# Patient Record
Sex: FEMALE | Race: WHITE | NOT HISPANIC OR LATINO | Employment: FULL TIME | URBAN - METROPOLITAN AREA
[De-identification: names, ages, dates, MRNs, and addresses within clinical notes are randomized per-mention and may not be internally consistent; named-entity substitution may affect disease eponyms.]

---

## 2022-05-12 ENCOUNTER — HOSPITAL ENCOUNTER (EMERGENCY)
Facility: HOSPITAL | Age: 22
Discharge: HOME | End: 2022-05-13
Attending: EMERGENCY MEDICINE

## 2022-05-12 ENCOUNTER — APPOINTMENT (EMERGENCY)
Dept: RADIOLOGY | Facility: HOSPITAL | Age: 22
End: 2022-05-12

## 2022-05-12 DIAGNOSIS — R00.2 PALPITATIONS: ICD-10-CM

## 2022-05-12 DIAGNOSIS — R07.9 CHEST PAIN: Primary | ICD-10-CM

## 2022-05-12 LAB
ANION GAP SERPL CALCULATED.3IONS-SCNC: 5 MMOL/L (ref 4–13)
BASOPHILS # BLD AUTO: 0.01 THOUSANDS/ΜL (ref 0–0.1)
BASOPHILS NFR BLD AUTO: 0 % (ref 0–1)
BUN SERPL-MCNC: 12 MG/DL (ref 5–25)
CALCIUM SERPL-MCNC: 9.1 MG/DL (ref 8.3–10.1)
CARDIAC TROPONIN I PNL SERPL HS: <2 NG/L
CARDIAC TROPONIN I PNL SERPL HS: <2 NG/L
CHLORIDE SERPL-SCNC: 107 MMOL/L (ref 100–108)
CO2 SERPL-SCNC: 27 MMOL/L (ref 21–32)
CREAT SERPL-MCNC: 0.65 MG/DL (ref 0.6–1.3)
D DIMER PPP FEU-MCNC: 0.34 UG/ML FEU
EOSINOPHIL # BLD AUTO: 0.01 THOUSAND/ΜL (ref 0–0.61)
EOSINOPHIL NFR BLD AUTO: 0 % (ref 0–6)
ERYTHROCYTE [DISTWIDTH] IN BLOOD BY AUTOMATED COUNT: 12.6 % (ref 11.6–15.1)
GFR SERPL CREATININE-BSD FRML MDRD: 128 ML/MIN/1.73SQ M
GLUCOSE SERPL-MCNC: 102 MG/DL (ref 65–140)
HCG SERPL QL: NEGATIVE
HCT VFR BLD AUTO: 36.6 % (ref 34.8–46.1)
HGB BLD-MCNC: 12.7 G/DL (ref 11.5–15.4)
IMM GRANULOCYTES # BLD AUTO: 0.02 THOUSAND/UL (ref 0–0.2)
IMM GRANULOCYTES NFR BLD AUTO: 0 % (ref 0–2)
LYMPHOCYTES # BLD AUTO: 2.74 THOUSANDS/ΜL (ref 0.6–4.47)
LYMPHOCYTES NFR BLD AUTO: 33 % (ref 14–44)
MCH RBC QN AUTO: 28.9 PG (ref 26.8–34.3)
MCHC RBC AUTO-ENTMCNC: 34.7 G/DL (ref 31.4–37.4)
MCV RBC AUTO: 83 FL (ref 82–98)
MONOCYTES # BLD AUTO: 0.89 THOUSAND/ΜL (ref 0.17–1.22)
MONOCYTES NFR BLD AUTO: 11 % (ref 4–12)
NEUTROPHILS # BLD AUTO: 4.74 THOUSANDS/ΜL (ref 1.85–7.62)
NEUTS SEG NFR BLD AUTO: 56 % (ref 43–75)
NRBC BLD AUTO-RTO: 0 /100 WBCS
PLATELET # BLD AUTO: 254 THOUSANDS/UL (ref 149–390)
PMV BLD AUTO: 10.9 FL (ref 8.9–12.7)
POTASSIUM SERPL-SCNC: 3.4 MMOL/L (ref 3.5–5.3)
RBC # BLD AUTO: 4.39 MILLION/UL (ref 3.81–5.12)
SODIUM SERPL-SCNC: 139 MMOL/L (ref 136–145)
WBC # BLD AUTO: 8.41 THOUSAND/UL (ref 4.31–10.16)

## 2022-05-12 PROCEDURE — 36415 COLL VENOUS BLD VENIPUNCTURE: CPT

## 2022-05-12 PROCEDURE — 85379 FIBRIN DEGRADATION QUANT: CPT

## 2022-05-12 PROCEDURE — 71045 X-RAY EXAM CHEST 1 VIEW: CPT

## 2022-05-12 PROCEDURE — 93005 ELECTROCARDIOGRAM TRACING: CPT

## 2022-05-12 PROCEDURE — 84703 CHORIONIC GONADOTROPIN ASSAY: CPT

## 2022-05-12 PROCEDURE — 80048 BASIC METABOLIC PNL TOTAL CA: CPT | Performed by: EMERGENCY MEDICINE

## 2022-05-12 PROCEDURE — 84484 ASSAY OF TROPONIN QUANT: CPT

## 2022-05-12 PROCEDURE — 99285 EMERGENCY DEPT VISIT HI MDM: CPT

## 2022-05-12 PROCEDURE — 85025 COMPLETE CBC W/AUTO DIFF WBC: CPT

## 2022-05-12 PROCEDURE — 99285 EMERGENCY DEPT VISIT HI MDM: CPT | Performed by: EMERGENCY MEDICINE

## 2022-05-12 RX ORDER — POTASSIUM CHLORIDE 20 MEQ/1
40 TABLET, EXTENDED RELEASE ORAL ONCE
Status: COMPLETED | OUTPATIENT
Start: 2022-05-12 | End: 2022-05-12

## 2022-05-12 RX ADMIN — POTASSIUM CHLORIDE 40 MEQ: 1500 TABLET, EXTENDED RELEASE ORAL at 22:26

## 2022-05-13 VITALS
OXYGEN SATURATION: 97 % | SYSTOLIC BLOOD PRESSURE: 117 MMHG | DIASTOLIC BLOOD PRESSURE: 62 MMHG | RESPIRATION RATE: 21 BRPM | TEMPERATURE: 98.9 F | HEART RATE: 78 BPM

## 2022-05-13 LAB
ATRIAL RATE: 106 BPM
P AXIS: 61 DEGREES
PR INTERVAL: 154 MS
QRS AXIS: 81 DEGREES
QRSD INTERVAL: 82 MS
QT INTERVAL: 328 MS
QTC INTERVAL: 435 MS
T WAVE AXIS: 47 DEGREES
VENTRICULAR RATE: 106 BPM

## 2022-05-13 PROCEDURE — 93010 ELECTROCARDIOGRAM REPORT: CPT | Performed by: INTERNAL MEDICINE

## 2022-05-13 NOTE — ED ATTENDING ATTESTATION
5/12/2022  IRian MD, saw and evaluated the patient  I have discussed the patient with the resident/non-physician practitioner and agree with the resident's/non-physician practitioner's findings, Plan of Care, and MDM as documented in the resident's/non-physician practitioner's note, except where noted  All available labs and Radiology studies were reviewed  I was present for key portions of any procedure(s) performed by the resident/non-physician practitioner and I was immediately available to provide assistance  At this point I agree with the current assessment done in the Emergency Department  I have conducted an independent evaluation of this patient a history and physical is as follows:    ED Course     16-year-old female, history MI and biological mother at age 39, presenting to the emergency department for evaluation of chest discomfort and palpitations that started at 19:30 today  Patient was seated in class when she had fairly abrupt onset sensation of palpitations associated chest discomfort which is described as sharp and stabbing multiple intermittent episodes within underlying chest tightness which has been continuous since 07/30  No cough  Some shortness of breath  No abdominal pain, nausea, vomiting, diarrhea  No lower extremity pain or swelling  Ten systems reviewed and negative except as noted in the history of present illness  The patient is resting comfortably on a stretcher in no acute respiratory distress  The patient appears nontoxic  HEENT reveals moist mucous membranes  Head is normocephalic and atraumatic  Conjunctiva and sclera are normal  Neck is nontender and supple with full range of motion to flexion, extension, lateral rotation  No meningismus appreciated  No masses are appreciated  Lungs are clear to auscultation bilaterally without any wheezes, rales or rhonchi  Heart is regular rate and rhythm without any murmurs, rubs or gallops   Abdomen is soft and nontender without any rebound or guarding  Extremities appear grossly normal without any significant arthropathy  Patient is awake, alert, and oriented x3  The patient has normal interaction  Motor is 5 out of 5  Labs Reviewed   BASIC METABOLIC PANEL - Abnormal       Result Value Ref Range Status    Sodium 139  136 - 145 mmol/L Final    Potassium 3 4 (*) 3 5 - 5 3 mmol/L Final    Chloride 107  100 - 108 mmol/L Final    CO2 27  21 - 32 mmol/L Final    ANION GAP 5  4 - 13 mmol/L Final    BUN 12  5 - 25 mg/dL Final    Creatinine 0 65  0 60 - 1 30 mg/dL Final    Comment: Standardized to IDMS reference method    Glucose 102  65 - 140 mg/dL Final    Comment: If the patient is fasting, the ADA then defines impaired fasting glucose as > 100 mg/dL and diabetes as > or equal to 123 mg/dL  Specimen collection should occur prior to Sulfasalazine administration due to the potential for falsely depressed results  Specimen collection should occur prior to Sulfapyridine administration due to the potential for falsely elevated results      Calcium 9 1  8 3 - 10 1 mg/dL Final    eGFR 128  ml/min/1 73sq m Final    Narrative:     [place] guidelines for Chronic Kidney Disease (CKD):     Stage 1 with normal or high GFR (GFR > 90 mL/min/1 73 square meters)    Stage 2 Mild CKD (GFR = 60-89 mL/min/1 73 square meters)    Stage 3A Moderate CKD (GFR = 45-59 mL/min/1 73 square meters)    Stage 3B Moderate CKD (GFR = 30-44 mL/min/1 73 square meters)    Stage 4 Severe CKD (GFR = 15-29 mL/min/1 73 square meters)    Stage 5 End Stage CKD (GFR <15 mL/min/1 73 square meters)  Note: GFR calculation is accurate only with a steady state creatinine   HS TROPONIN I 0HR - Normal    hs TnI 0hr <2  "Refer to ACS Flowchart"- see link ng/L Final    Comment:                                              Initial (time 0) result  If >=50 ng/L, Myocardial injury suggested ;  Type of myocardial injury and treatment strategy  to be determined  If 5-49 ng/L, a delta result at 2 hours and or 4 hours will be needed to further evaluate  If <4 ng/L, and chest pain has been >3 hours since onset, patient may qualify for discharge based on the HEART score in the ED  If <5 ng/L and <3hours since onset of chest pain, a delta result at 2 hours will be needed to further evaluate  HS Troponin 99th Percentile URL of a Health Population=12 ng/L with a 95% Confidence Interval of 8-18 ng/L  Second Troponin (time 2 hours)  If calculated delta >= 20 ng/L,  Myocardial injury suggested ; Type of myocardial injury and treatment strategy to be determined  If 5-49 ng/L and the calculated delta is 5-19 ng/L, consult medical service for evaluation  Continue evaluation for ischemia on ecg and other possible etiology and repeat hs troponin at 4 hours  If delta is <5 ng/L at 2 hours, consider discharge based on risk stratification via the HEART score (if in ED), or KATHY risk score in IP/Observation  HS Troponin 99th Percentile URL of a Health Population=12 ng/L with a 95% Confidence Interval of 8-18 ng/L  D-DIMER, QUANTITATIVE - Normal    D-Dimer, Quant 0 34  <0 50 ug/ml FEU Final    Comment: Reference and upper limits to exclude DVT and PE are the same  Do not use to exclude if clinical symptoms are present  Pregnant women:  1st trimester:  <0 22 - 1 06 ug/ml FEU  2nd trimester:  <0 22 - 1 88 ug/ml FEU  3rd trimester:   0 24 - 3 28 ug/ml FEU    Note: Normal ranges may not apply to patients who are transgender, non-binary, or whose legal sex, sex at birth, and gender identity differ       PREGNANCY TEST (HCG QUALITATIVE) - Normal    Preg, Serum Negative  Negative Final   HS TROPONIN I 2HR    hs TnI 2hr <2  "Refer to ACS Flowchart"- see link ng/L Final    Comment:                                              Initial (time 0) result  If >=50 ng/L, Myocardial injury suggested ;  Type of myocardial injury and treatment strategy  to be determined  If 5-49 ng/L, a delta result at 2 hours and or 4 hours will be needed to further evaluate  If <4 ng/L, and chest pain has been >3 hours since onset, patient may qualify for discharge based on the HEART score in the ED  If <5 ng/L and <3hours since onset of chest pain, a delta result at 2 hours will be needed to further evaluate  HS Troponin 99th Percentile URL of a Health Population=12 ng/L with a 95% Confidence Interval of 8-18 ng/L  Second Troponin (time 2 hours)  If calculated delta >= 20 ng/L,  Myocardial injury suggested ; Type of myocardial injury and treatment strategy to be determined  If 5-49 ng/L and the calculated delta is 5-19 ng/L, consult medical service for evaluation  Continue evaluation for ischemia on ecg and other possible etiology and repeat hs troponin at 4 hours  If delta is <5 ng/L at 2 hours, consider discharge based on risk stratification via the HEART score (if in ED), or KATHY risk score in IP/Observation  HS Troponin 99th Percentile URL of a Health Population=12 ng/L with a 95% Confidence Interval of 8-18 ng/L      Delta 2hr hsTnI     Final    Comment: Unable to Calculate   CBC AND DIFFERENTIAL    WBC 8 41  4 31 - 10 16 Thousand/uL Final    RBC 4 39  3 81 - 5 12 Million/uL Final    Hemoglobin 12 7  11 5 - 15 4 g/dL Final    Hematocrit 36 6  34 8 - 46 1 % Final    MCV 83  82 - 98 fL Final    MCH 28 9  26 8 - 34 3 pg Final    MCHC 34 7  31 4 - 37 4 g/dL Final    RDW 12 6  11 6 - 15 1 % Final    MPV 10 9  8 9 - 12 7 fL Final    Platelets 482  475 - 390 Thousands/uL Final    nRBC 0  /100 WBCs Final    Neutrophils Relative 56  43 - 75 % Final    Immat GRANS % 0  0 - 2 % Final    Lymphocytes Relative 33  14 - 44 % Final    Monocytes Relative 11  4 - 12 % Final    Eosinophils Relative 0  0 - 6 % Final    Basophils Relative 0  0 - 1 % Final    Neutrophils Absolute 4 74  1 85 - 7 62 Thousands/µL Final    Immature Grans Absolute 0 02  0 00 - 0 20 Thousand/uL Final Lymphocytes Absolute 2 74  0 60 - 4 47 Thousands/µL Final    Monocytes Absolute 0 89  0 17 - 1 22 Thousand/µL Final    Eosinophils Absolute 0 01  0 00 - 0 61 Thousand/µL Final    Basophils Absolute 0 01  0 00 - 0 10 Thousands/µL Final       XR chest 1 view portable   ED Interpretation   No cardiopulmonary process noted      Final Result      No radiographic evidence of acute intrathoracic process                    Workstation performed: [id number]                 Critical Care Time  Procedures

## 2022-05-13 NOTE — ED PROVIDER NOTES
History  Chief Complaint   Patient presents with    Chest Pain     Palpitations and midsternal chest pain (stabbing and tightness) x 30 mins     Patient is a 27-year-old female with no significant past medical history presenting to the emergency department for evaluation of chest pain that started around 730 this evening  Patient states that she was sitting in class when she started to develop chest tightness with associated shortness of breath and palpitations  Patient denies any diaphoresis or radiation of the pain she denies any numbness or tingling in any of her extremities any nausea, vomiting or diarrhea  Patient has not tried anything for symptoms  Patient states that she has had this prior in the past and has worn Holter monitors for over 48 hours which did not show any significant arrhythmias  Patient does currently take birth control  She denies any cough, hemoptysis, unilateral leg swelling  Patient's birth mother  within the month  Patient's last menstrual period ended the 1st week in May  None       History reviewed  No pertinent past medical history  History reviewed  No pertinent surgical history  History reviewed  No pertinent family history  I have reviewed and agree with the history as documented  E-Cigarette/Vaping     E-Cigarette/Vaping Substances           Review of Systems   Constitutional: Positive for activity change  Negative for chills and fever  Respiratory: Positive for shortness of breath  Negative for cough and chest tightness  Cardiovascular: Positive for chest pain and palpitations  Negative for leg swelling  Gastrointestinal: Negative for abdominal pain, constipation, diarrhea, nausea and vomiting  Genitourinary: Negative for dysuria and hematuria  Musculoskeletal: Negative for arthralgias and back pain  Skin: Negative for color change and rash     Neurological: Negative for dizziness, seizures, syncope, weakness, light-headedness and headaches  Psychiatric/Behavioral: Negative for agitation and behavioral problems  All other systems reviewed and are negative  Physical Exam  ED Triage Vitals [05/12/22 2014]   Temperature Pulse Respirations Blood Pressure SpO2   98 9 °F (37 2 °C) (!) 108 16 133/87 98 %      Temp Source Heart Rate Source Patient Position - Orthostatic VS BP Location FiO2 (%)   Oral -- -- -- --      Pain Score       --             Orthostatic Vital Signs  Vitals:    05/12/22 2014 05/12/22 2230 05/12/22 2330 05/13/22 0100   BP: 133/87   117/62   Pulse: (!) 108 88 80 78       Physical Exam  Vitals and nursing note reviewed  Constitutional:       General: She is not in acute distress  Appearance: She is well-developed  HENT:      Head: Normocephalic and atraumatic  Eyes:      Extraocular Movements: Extraocular movements intact  Conjunctiva/sclera: Conjunctivae normal       Pupils: Pupils are equal, round, and reactive to light  Cardiovascular:      Rate and Rhythm: Regular rhythm  Tachycardia present  Heart sounds: Normal heart sounds  No murmur heard  Pulmonary:      Effort: Pulmonary effort is normal  No respiratory distress  Breath sounds: Normal breath sounds  No decreased breath sounds, wheezing or rhonchi  Chest:      Chest wall: No tenderness or edema  Abdominal:      Palpations: Abdomen is soft  Tenderness: There is no abdominal tenderness  Musculoskeletal:      Cervical back: Neck supple  Right lower leg: No tenderness  No edema  Left lower leg: No tenderness  No edema  Skin:     General: Skin is warm and dry  Capillary Refill: Capillary refill takes less than 2 seconds  Neurological:      General: No focal deficit present  Mental Status: She is alert and oriented to person, place, and time     Psychiatric:         Mood and Affect: Mood normal          Behavior: Behavior normal          ED Medications  Medications   potassium chloride (K-DUR,KLOR-CON) CR tablet 40 mEq (40 mEq Oral Given 5/12/22 2226)       Diagnostic Studies  Results Reviewed     Procedure Component Value Units Date/Time    HS Troponin I 2hr [[id number]] Collected: 05/12/22 2243    Lab Status: Final result Specimen: Blood from Arm, Left Updated: 05/12/22 2353     hs TnI 2hr <2 ng/L      Delta 2hr hsTnI --    Basic metabolic panel [[id number]]  (Abnormal) Collected: 05/12/22 2026    Lab Status: Final result Specimen: Blood from Arm, Left Updated: 05/12/22 2158     Sodium 139 mmol/L      Potassium 3 4 mmol/L      Chloride 107 mmol/L      CO2 27 mmol/L      ANION GAP 5 mmol/L      BUN 12 mg/dL      Creatinine 0 65 mg/dL      Glucose 102 mg/dL      Calcium 9 1 mg/dL      eGFR 128 ml/min/1 73sq m     Narrative:      [place] guidelines for Chronic Kidney Disease (CKD):     Stage 1 with normal or high GFR (GFR > 90 mL/min/1 73 square meters)    Stage 2 Mild CKD (GFR = 60-89 mL/min/1 73 square meters)    Stage 3A Moderate CKD (GFR = 45-59 mL/min/1 73 square meters)    Stage 3B Moderate CKD (GFR = 30-44 mL/min/1 73 square meters)    Stage 4 Severe CKD (GFR = 15-29 mL/min/1 73 square meters)    Stage 5 End Stage CKD (GFR <15 mL/min/1 73 square meters)  Note: GFR calculation is accurate only with a steady state creatinine    CBC and differential [[id number]] Collected: 05/12/22 2135    Lab Status: Final result Specimen: Blood from Arm, Right Updated: 05/12/22 2152     WBC 8 41 Thousand/uL      RBC 4 39 Million/uL      Hemoglobin 12 7 g/dL      Hematocrit 36 6 %      MCV 83 fL      MCH 28 9 pg      MCHC 34 7 g/dL      RDW 12 6 %      MPV 10 9 fL      Platelets 895 Thousands/uL      nRBC 0 /100 WBCs      Neutrophils Relative 56 %      Immat GRANS % 0 %      Lymphocytes Relative 33 %      Monocytes Relative 11 %      Eosinophils Relative 0 %      Basophils Relative 0 %      Neutrophils Absolute 4 74 Thousands/µL      Immature Grans Absolute 0 02 Thousand/uL      Lymphocytes Absolute 2 74 Thousands/µL      Monocytes Absolute 0 89 Thousand/µL      Eosinophils Absolute 0 01 Thousand/µL      Basophils Absolute 0 01 Thousands/µL     HS Troponin 0hr (reflex protocol) [[id number]]  (Normal) Collected: 05/12/22 2026    Lab Status: Final result Specimen: Blood from Arm, Left Updated: 05/12/22 2129     hs TnI 0hr <2 ng/L     hCG, qualitative pregnancy [[id number]]  (Normal) Collected: 05/12/22 2026    Lab Status: Final result Specimen: Blood from Arm, Left Updated: 05/12/22 2114     Preg, Serum Negative    D-dimer, quantitative [[id number]]  (Normal) Collected: 05/12/22 2026    Lab Status: Final result Specimen: Blood from Arm, Left Updated: 05/12/22 2110     D-Dimer, Quant 0 34 ug/ml FEU                  XR chest 1 view portable   ED Interpretation by Thierry Ritchie DO (05/12 2135)   No cardiopulmonary process noted      Final Result by Kanchan Siegel MD (05/13 0825)      No radiographic evidence of acute intrathoracic process  Workstation performed: [id number]               Procedures  ECG 12 Lead Documentation Only    Date/Time: 5/12/2022 9:26 PM  Performed by: Thierry Ritchie DO  Authorized by:  Thierry Ritchie DO     Indications / Diagnosis:  Cp  ECG reviewed by me, the ED Provider: yes    Patient location:  ED  Previous ECG:     Previous ECG:  Unavailable  Interpretation:     Interpretation: abnormal    Rate:     ECG rate:  106    ECG rate assessment: tachycardic    Rhythm:     Rhythm: sinus rhythm    Ectopy:     Ectopy: none    QRS:     QRS axis:  Normal    QRS intervals:  Normal  Conduction:     Conduction: normal    ST segments:     ST segments:  Normal  T waves:     T waves: inverted      Inverted:  AVR and V1          ED Course             HEART Risk Score    Flowsheet Row Most Recent Value   Heart Score Risk Calculator    History 0 Filed at: 05/20/2022 1554   ECG 0 Filed at: 05/20/2022 1554   Age 0 Filed at: 05/20/2022 1554   Risk Factors 0 Filed at: 05/20/2022 1554   Troponin 0 Filed at: 05/20/2022 1554   HEART Score 0 Filed at: 05/20/2022 1554              PERC Rule for PE    Flowsheet Row Most Recent Value   PERC Rule for PE    Age >=50 0 Filed at: 05/12/2022 2018   HR >=100 1 Filed at: 05/12/2022 2018   O2 Sat on room air < 95% 0 Filed at: 05/12/2022 2018   History of PE or DVT 0 Filed at: 05/12/2022 2018   Recent trauma or surgery 0 Filed at: 05/12/2022 2018   Hemoptysis 0 Filed at: 05/12/2022 2018   Exogenous estrogen 1 Filed at: 05/12/2022 2018   Unilateral leg swelling 0 Filed at: 05/12/2022 2018   [facility] Rule for PE Results 2 Filed at: 05/12/2022 2018                        [facility]  Number of Diagnoses or Management Options  Chest pain  Palpitations  Diagnosis management comments: Patient evaluated for chest pain and palpitations with cardiac workup, d-dimer  Patient advised to follow up with cardiology for re-evaluation  Advised her to come back to the hospital if she develops any new, worsening or concerning symptoms  Disposition  Final diagnoses:   Chest pain   Palpitations     Time reflects when diagnosis was documented in both MDM as applicable and the Disposition within this note     Time User Action Codes Description Comment    5/12/2022  9:36 PM Linda Repress Add [R07 9] Chest pain     5/12/2022  9:36 PM Linda Repress Add [R00 2] Palpitations       ED Disposition     ED Disposition   Discharge    Condition   Stable    Date/Time   Fri May 13, 2022  1:20 AM    Comment   Narendra Calle discharge to home/self care                 Follow-up Information     Follow up With Specialties Details Why Contact Info Additional Information    Kerry Rees,  Family Medicine Schedule an appointment as soon as possible for a visit  for follow up [facility] Emergency Department Emergency Medicine Go to  As needed, If symptoms worsen [address] [facility] Emergency Department, [address], [place], [place] [address]    [address] [facility] Cardiology Schedule an appointment as soon as possible for a visit  for follow up [address] [facility] [address] [facility] Cardiology [place], [address], [place], [place], [address]          There are no discharge medications for this patient  No discharge procedures on file  PDMP Review     None           ED Provider  Attending physically available and evaluated Evelyn Armando I managed the patient along with the ED Attending      Electronically Signed by         Mattie Torres DO  05/14/22 2011       Kiera Carbajal MD  05/20/22 1895

## 2022-07-06 ENCOUNTER — HOSPITAL ENCOUNTER (EMERGENCY)
Facility: HOSPITAL | Age: 22
Discharge: HOME | End: 2022-07-07
Attending: EMERGENCY MEDICINE | Admitting: EMERGENCY MEDICINE
Payer: COMMERCIAL

## 2022-07-06 VITALS
TEMPERATURE: 98.3 F | OXYGEN SATURATION: 96 % | WEIGHT: 150 LBS | SYSTOLIC BLOOD PRESSURE: 126 MMHG | RESPIRATION RATE: 18 BRPM | DIASTOLIC BLOOD PRESSURE: 88 MMHG | BODY MASS INDEX: 25.61 KG/M2 | HEIGHT: 64 IN | HEART RATE: 95 BPM

## 2022-07-06 DIAGNOSIS — J45.909 REACTIVE AIRWAY DISEASE: ICD-10-CM

## 2022-07-06 DIAGNOSIS — U07.1 COVID-19: Primary | ICD-10-CM

## 2022-07-06 PROCEDURE — 99284 EMERGENCY DEPT VISIT MOD MDM: CPT

## 2022-07-06 PROCEDURE — 94640 AIRWAY INHALATION TREATMENT: CPT

## 2022-07-06 RX ADMIN — IPRATROPIUM BROMIDE 0.5 MG: 0.5 SOLUTION RESPIRATORY (INHALATION) at 23:45

## 2022-07-06 RX ADMIN — ALBUTEROL SULFATE 5 MG: 2.5 SOLUTION RESPIRATORY (INHALATION) at 23:45

## 2022-07-06 NOTE — Clinical Note
Jim Leanne was seen and treated in our emergency department on 7/6/2022  Diagnosis:     Arthur Yeung  may return to school on return date  She may return on this date: 07/11/2022         If you have any questions or concerns, please don't hesitate to call        Arthur Cifuentes, DO    ______________________________           _______________          _______________  Hospital Representative                              Date                                Time

## 2022-07-06 NOTE — Clinical Note
Paul Branch was seen and treated in our emergency department on 7/6/2022  Diagnosis:     Mary Rogers  may return to work on return date  She may return on this date: 07/11/2022         If you have any questions or concerns, please don't hesitate to call        Jasmine Pérez, DO    ______________________________           _______________          _______________  Hospital Representative                              Date                                Time

## 2022-07-07 ENCOUNTER — APPOINTMENT (EMERGENCY)
Dept: RADIOLOGY | Facility: HOSPITAL | Age: 22
End: 2022-07-07

## 2022-07-07 PROCEDURE — 71045 X-RAY EXAM CHEST 1 VIEW: CPT

## 2022-07-07 PROCEDURE — 99284 EMERGENCY DEPT VISIT MOD MDM: CPT | Performed by: EMERGENCY MEDICINE

## 2022-07-07 RX ORDER — ALBUTEROL SULFATE 90 UG/1
2 AEROSOL, METERED RESPIRATORY (INHALATION) EVERY 4 HOURS PRN
Qty: 8 G | Refills: 0 | Status: SHIPPED | OUTPATIENT
Start: 2022-07-07

## 2022-07-07 RX ADMIN — ALBUTEROL SULFATE 5 MG: 2.5 SOLUTION RESPIRATORY (INHALATION) at 01:58

## 2022-07-07 RX ADMIN — IPRATROPIUM BROMIDE 0.5 MG: 0.5 SOLUTION RESPIRATORY (INHALATION) at 01:58

## 2022-07-07 NOTE — ED ATTENDING ATTESTATION
7/6/2022  IDelta MD, saw and evaluated the patient  I have discussed the patient with the resident/non-physician practitioner and agree with the resident's/non-physician practitioner's findings, Plan of Care, and MDM as documented in the resident's/non-physician practitioner's note, except where noted  All available labs and Radiology studies were reviewed  I was present for key portions of any procedure(s) performed by the resident/non-physician practitioner and I was immediately available to provide assistance  At this point I agree with the current assessment done in the Emergency Department  I have conducted an independent evaluation of this patient a history and physical is as follows:    ED Course     46-year-old female, presenting to the emergency department for evaluation of sore throat, cough, wheezing and shortness of breath  Patient took a home COVID test today that was positive  No chest pain, abdominal pain, nausea, vomiting, diarrhea  Ten systems reviewed and negative except as noted  The patient is resting comfortably on a stretcher in no acute respiratory distress  The patient appears nontoxic  HEENT reveals moist mucous membranes  Head is normocephalic and atraumatic  Conjunctiva and sclera are normal  Neck is nontender and supple with full range of motion to flexion, extension, lateral rotation  No meningismus appreciated  No masses are appreciated  Lungs are clear to auscultation bilaterally without any wheezes, rales or rhonchi  Heart is regular rate and rhythm without any murmurs, rubs or gallops  Abdomen is soft and nontender without any rebound or guarding  Extremities appear grossly normal without any significant arthropathy  Patient is awake, alert, and oriented x3  The patient has normal interaction  Motor is 5 out of 5  XR chest 1 view portable   ED Interpretation   No acute pulmonary disease  Final Result      No acute cardiopulmonary disease  Workstation performed: [id number]               Critical Care Time  Procedures

## 2022-07-07 NOTE — DISCHARGE INSTRUCTIONS
Prescription for an inhaler was sent to your pharmacy  Please use the following pain medications as prescribed:  - Tylenol 650mg every 6 hours  - Motrin 400mg every 6 hours  They work in different ways so can be used together at the same time

## 2022-08-21 ENCOUNTER — HOSPITAL ENCOUNTER (EMERGENCY)
Facility: HOSPITAL | Age: 22
Discharge: HOME | End: 2022-08-21
Attending: EMERGENCY MEDICINE

## 2022-08-21 VITALS
BODY MASS INDEX: 25.44 KG/M2 | WEIGHT: 149 LBS | HEIGHT: 64 IN | HEART RATE: 76 BPM | TEMPERATURE: 98.4 F | RESPIRATION RATE: 17 BRPM | OXYGEN SATURATION: 100 % | SYSTOLIC BLOOD PRESSURE: 113 MMHG | DIASTOLIC BLOOD PRESSURE: 73 MMHG

## 2022-08-21 DIAGNOSIS — F12.10 MARIJUANA ABUSE: Primary | ICD-10-CM

## 2022-08-21 LAB
AMPHETAMINES SERPL QL SCN: NEGATIVE
ANION GAP SERPL CALCULATED.3IONS-SCNC: 5 MMOL/L (ref 4–13)
BARBITURATES UR QL: NEGATIVE
BASOPHILS # BLD AUTO: 0.02 THOUSANDS/ΜL (ref 0–0.1)
BASOPHILS NFR BLD AUTO: 0 % (ref 0–1)
BENZODIAZ UR QL: NEGATIVE
BILIRUB UR QL STRIP: NEGATIVE
BUN SERPL-MCNC: 12 MG/DL (ref 5–25)
CALCIUM SERPL-MCNC: 9.7 MG/DL (ref 8.4–10.2)
CHLORIDE SERPL-SCNC: 103 MMOL/L (ref 96–108)
CLARITY UR: CLEAR
CO2 SERPL-SCNC: 31 MMOL/L (ref 21–32)
COCAINE UR QL: NEGATIVE
COLOR UR: YELLOW
CREAT SERPL-MCNC: 0.55 MG/DL (ref 0.6–1.3)
EOSINOPHIL # BLD AUTO: 0.05 THOUSAND/ΜL (ref 0–0.61)
EOSINOPHIL NFR BLD AUTO: 1 % (ref 0–6)
ERYTHROCYTE [DISTWIDTH] IN BLOOD BY AUTOMATED COUNT: 13.1 % (ref 11.6–15.1)
EXT PREG TEST URINE: NEGATIVE
EXT. CONTROL ED NAV: NORMAL
GFR SERPL CREATININE-BSD FRML MDRD: 135 ML/MIN/1.73SQ M
GLUCOSE SERPL-MCNC: 70 MG/DL (ref 65–140)
GLUCOSE SERPL-MCNC: 79 MG/DL (ref 65–140)
GLUCOSE UR STRIP-MCNC: NEGATIVE MG/DL
HCT VFR BLD AUTO: 42.4 % (ref 34.8–46.1)
HGB BLD-MCNC: 14 G/DL (ref 11.5–15.4)
HGB UR QL STRIP.AUTO: NEGATIVE
IMM GRANULOCYTES # BLD AUTO: 0.01 THOUSAND/UL (ref 0–0.2)
IMM GRANULOCYTES NFR BLD AUTO: 0 % (ref 0–2)
KETONES UR STRIP-MCNC: NEGATIVE MG/DL
LEUKOCYTE ESTERASE UR QL STRIP: NEGATIVE
LYMPHOCYTES # BLD AUTO: 2.64 THOUSANDS/ΜL (ref 0.6–4.47)
LYMPHOCYTES NFR BLD AUTO: 38 % (ref 14–44)
MCH RBC QN AUTO: 28.4 PG (ref 26.8–34.3)
MCHC RBC AUTO-ENTMCNC: 33 G/DL (ref 31.4–37.4)
MCV RBC AUTO: 86 FL (ref 82–98)
METHADONE UR QL: NEGATIVE
MONOCYTES # BLD AUTO: 0.77 THOUSAND/ΜL (ref 0.17–1.22)
MONOCYTES NFR BLD AUTO: 11 % (ref 4–12)
NEUTROPHILS # BLD AUTO: 3.49 THOUSANDS/ΜL (ref 1.85–7.62)
NEUTS SEG NFR BLD AUTO: 50 % (ref 43–75)
NITRITE UR QL STRIP: NEGATIVE
NRBC BLD AUTO-RTO: 0 /100 WBCS
OPIATES UR QL SCN: NEGATIVE
OXYCODONE+OXYMORPHONE UR QL SCN: NEGATIVE
PCP UR QL: NEGATIVE
PH UR STRIP.AUTO: 6 [PH]
PLATELET # BLD AUTO: 237 THOUSANDS/UL (ref 149–390)
PMV BLD AUTO: 10.6 FL (ref 8.9–12.7)
POTASSIUM SERPL-SCNC: 3.9 MMOL/L (ref 3.5–5.3)
PROT UR STRIP-MCNC: NEGATIVE MG/DL
RBC # BLD AUTO: 4.93 MILLION/UL (ref 3.81–5.12)
SODIUM SERPL-SCNC: 139 MMOL/L (ref 135–147)
SP GR UR STRIP.AUTO: >=1.03 (ref 1–1.03)
THC UR QL: POSITIVE
UROBILINOGEN UR QL STRIP.AUTO: 0.2 E.U./DL
WBC # BLD AUTO: 6.98 THOUSAND/UL (ref 4.31–10.16)

## 2022-08-21 PROCEDURE — 81003 URINALYSIS AUTO W/O SCOPE: CPT | Performed by: FAMILY MEDICINE

## 2022-08-21 PROCEDURE — 85025 COMPLETE CBC W/AUTO DIFF WBC: CPT | Performed by: FAMILY MEDICINE

## 2022-08-21 PROCEDURE — 80048 BASIC METABOLIC PNL TOTAL CA: CPT | Performed by: FAMILY MEDICINE

## 2022-08-21 PROCEDURE — 99282 EMERGENCY DEPT VISIT SF MDM: CPT | Performed by: FAMILY MEDICINE

## 2022-08-21 PROCEDURE — 81025 URINE PREGNANCY TEST: CPT | Performed by: FAMILY MEDICINE

## 2022-08-21 PROCEDURE — 93005 ELECTROCARDIOGRAM TRACING: CPT

## 2022-08-21 PROCEDURE — 99284 EMERGENCY DEPT VISIT MOD MDM: CPT

## 2022-08-21 PROCEDURE — 96360 HYDRATION IV INFUSION INIT: CPT

## 2022-08-21 PROCEDURE — 80307 DRUG TEST PRSMV CHEM ANLYZR: CPT | Performed by: FAMILY MEDICINE

## 2022-08-21 PROCEDURE — 82948 REAGENT STRIP/BLOOD GLUCOSE: CPT

## 2022-08-21 PROCEDURE — 36415 COLL VENOUS BLD VENIPUNCTURE: CPT | Performed by: FAMILY MEDICINE

## 2022-08-21 RX ORDER — SERTRALINE HYDROCHLORIDE 100 MG/1
100 TABLET, FILM COATED ORAL DAILY
COMMUNITY

## 2022-08-21 RX ADMIN — SODIUM CHLORIDE 1000 ML: 0.9 INJECTION, SOLUTION INTRAVENOUS at 13:28

## 2022-08-21 NOTE — ED PROVIDER NOTES
History  Chief Complaint   Patient presents with    Dizziness     Near syncope     HPI  This is a 27-year-old female in presented to ED with the complain of a near syncope episode  Patient states that she smoked marijuana around the 9:00 a m  this morning her friend was trying to wake her up she did respond in but was mumbling lasted for 1/2 hour then resolved  She is currently awake alert oriented x3 GCS 15  She denies any chest pain shortness of breath  Denies any abdominal pain nausea vomiting or diarrhea  Denies any headache blurry vision double vision at this time  Sitting comfortably in bed answer questions appropriately  Prior to Admission Medications   Prescriptions Last Dose Informant Patient Reported? Taking? albuterol (ProAir HFA) 90 mcg/act inhaler Past Month at Unknown time  No Yes   Sig: Inhale 2 puffs every 4 (four) hours as needed for wheezing   sertraline (ZOLOFT) 100 mg tablet Past Week at Unknown time  Yes Yes   Sig: Take 100 mg by mouth daily      Facility-Administered Medications: None       Past Medical History:   Diagnosis Date    Asthma        No past surgical history on file  No family history on file  I have reviewed and agree with the history as documented  E-Cigarette/Vaping    E-Cigarette Use Current Some Day User      E-Cigarette/Vaping Substances    Nicotine Yes      Social History     Tobacco Use    Smoking status: Never Smoker    Smokeless tobacco: Never Used   Vaping Use    Vaping Use: Some days    Substances: Nicotine   Substance Use Topics    Alcohol use: Yes     Alcohol/week: 1 0 standard drink     Types: 1 Standard drinks or equivalent per week    Drug use: Never       Review of Systems   Constitutional: Negative  HENT: Negative  Eyes: Negative  Respiratory: Negative  Cardiovascular: Negative  Gastrointestinal: Negative  Endocrine: Negative  Genitourinary: Negative  Musculoskeletal: Negative  Skin: Negative      Neurological: Positive for dizziness  Hematological: Negative  Psychiatric/Behavioral: Negative  Negative for agitation, behavioral problems, confusion and sleep disturbance  The patient is not nervous/anxious  Physical Exam  Physical Exam  Vitals and nursing note reviewed  Constitutional:       Appearance: She is well-developed  HENT:      Head: Normocephalic and atraumatic  Right Ear: External ear normal       Left Ear: External ear normal       Nose: Nose normal       Mouth/Throat:      Mouth: Mucous membranes are moist       Pharynx: No oropharyngeal exudate  Eyes:      General: No scleral icterus  Right eye: No discharge  Left eye: No discharge  Conjunctiva/sclera: Conjunctivae normal       Pupils: Pupils are equal, round, and reactive to light  Cardiovascular:      Rate and Rhythm: Normal rate and regular rhythm  Pulses: Normal pulses  Heart sounds: Normal heart sounds  Pulmonary:      Effort: Pulmonary effort is normal  No respiratory distress  Breath sounds: Normal breath sounds  No wheezing  Abdominal:      General: Bowel sounds are normal       Palpations: Abdomen is soft  Musculoskeletal:         General: Normal range of motion  Cervical back: Normal range of motion and neck supple  Lymphadenopathy:      Cervical: No cervical adenopathy  Skin:     General: Skin is warm and dry  Capillary Refill: Capillary refill takes less than 2 seconds  Neurological:      General: No focal deficit present  Mental Status: She is alert and oriented to person, place, and time     Psychiatric:         Mood and Affect: Mood normal          Behavior: Behavior normal          Vital Signs  ED Triage Vitals   Temperature Pulse Respirations Blood Pressure SpO2   08/21/22 1327 08/21/22 1255 08/21/22 1255 08/21/22 1255 08/21/22 1255   98 4 °F (36 9 °C) 75 18 136/78 100 %      Temp Source Heart Rate Source Patient Position - Orthostatic VS BP Location FiO2 (%) 08/21/22 1327 08/21/22 1255 08/21/22 1255 08/21/22 1255 --   Oral Monitor Lying Left arm       Pain Score       08/21/22 1255       No Pain           Vitals:    08/21/22 1255 08/21/22 1400   BP: 136/78 113/73   Pulse: 75 76   Patient Position - Orthostatic VS: Lying Sitting         Visual Acuity      ED Medications  Medications   sodium chloride 0 9 % bolus 1,000 mL (1,000 mL Intravenous New Bag 8/21/22 1328)       Diagnostic Studies  Results Reviewed     Procedure Component Value Units Date/Time    Rapid drug screen, urine [[id number]]  (Abnormal) Collected: 08/21/22 1335    Lab Status: Final result Specimen: Urine, Clean Catch Updated: 08/21/22 1404     Amph/Meth UR Negative     Barbiturate Ur Negative     Benzodiazepine Urine Negative     Cocaine Urine Negative     Methadone Urine Negative     Opiate Urine Negative     PCP Ur Negative     THC Urine Positive     Oxycodone Urine Negative    Narrative:      Presumptive report  If requested, specimen will be sent to reference lab for confirmation  FOR MEDICAL PURPOSES ONLY  IF CONFIRMATION NEEDED PLEASE CONTACT THE LAB WITHIN 5 DAYS      Drug Screen Cutoff Levels:  AMPHETAMINE/METHAMPHETAMINES  1000 ng/mL  BARBITURATES     200 ng/mL  BENZODIAZEPINES     200 ng/mL  COCAINE      300 ng/mL  METHADONE      300 ng/mL  OPIATES      300 ng/mL  PHENCYCLIDINE     25 ng/mL  THC       50 ng/mL  OXYCODONE      100 ng/mL    Basic metabolic panel [[id number]]  (Abnormal) Collected: 08/21/22 1327    Lab Status: Final result Specimen: Blood from Arm, Right Updated: 08/21/22 1400     Sodium 139 mmol/L      Potassium 3 9 mmol/L      Chloride 103 mmol/L      CO2 31 mmol/L      ANION GAP 5 mmol/L      BUN 12 mg/dL      Creatinine 0 55 mg/dL      Glucose 70 mg/dL      Calcium 9 7 mg/dL      eGFR 135 ml/min/1 73sq m     Narrative:      [facility] guidelines for Chronic Kidney Disease (CKD):     Stage 1 with normal or high GFR (GFR > 90 mL/min/1 73 square meters)    Stage 2 Mild CKD (GFR = 60-89 mL/min/1 73 square meters)    Stage 3A Moderate CKD (GFR = 45-59 mL/min/1 73 square meters)    Stage 3B Moderate CKD (GFR = 30-44 mL/min/1 73 square meters)    Stage 4 Severe CKD (GFR = 15-29 mL/min/1 73 square meters)    Stage 5 End Stage CKD (GFR <15 mL/min/1 73 square meters)  Note: GFR calculation is accurate only with a steady state creatinine    UA w Reflex to Microscopic w Reflex to Culture [[id number]]  (Abnormal) Collected: 08/21/22 1336    Lab Status: Final result Specimen: Urine, Clean Catch Updated: 08/21/22 1350     Color, UA Yellow     Clarity, UA Clear     Specific Gravity, UA >=1 030     pH, UA 6 0     Leukocytes, UA Negative     Nitrite, UA Negative     Protein, UA Negative mg/dl      Glucose, UA Negative mg/dl      Ketones, UA Negative mg/dl      Urobilinogen, UA 0 2 E U /dl      Bilirubin, UA Negative     Occult Blood, UA Negative    D-Dimer [[id number]] Updated: 08/21/22 1350    Lab Status: No result Specimen: Blood from Arm, Right     CBC and differential [[id number]] Collected: 08/21/22 1327    Lab Status: Final result Specimen: Blood from Arm, Right Updated: 08/21/22 1343     WBC 6 98 Thousand/uL      RBC 4 93 Million/uL      Hemoglobin 14 0 g/dL      Hematocrit 42 4 %      MCV 86 fL      MCH 28 4 pg      MCHC 33 0 g/dL      RDW 13 1 %      MPV 10 6 fL      Platelets 480 Thousands/uL      nRBC 0 /100 WBCs      Neutrophils Relative 50 %      Immat GRANS % 0 %      Lymphocytes Relative 38 %      Monocytes Relative 11 %      Eosinophils Relative 1 %      Basophils Relative 0 %      Neutrophils Absolute 3 49 Thousands/µL      Immature Grans Absolute 0 01 Thousand/uL      Lymphocytes Absolute 2 64 Thousands/µL      Monocytes Absolute 0 77 Thousand/µL      Eosinophils Absolute 0 05 Thousand/µL      Basophils Absolute 0 02 Thousands/µL     POCT pregnancy, urine [[id number]]  (Normal) Resulted: 08/21/22 1334    Lab Status: Final result Updated: 08/21/22 1335 EXT PREG TEST UR (Ref: Negative) Negative     Control Valid    Fingerstick Glucose (POCT) [[id number]]  (Normal) Collected: 08/21/22 1321    Lab Status: Final result Updated: 08/21/22 1332     POC Glucose 79 mg/dl                  No orders to display              Procedures  Procedures         ED Course  ED Course as of 08/21/22 1424   Sun Aug 21, 2022   1419 Patient states she is feeling much better at baseline now  Thinks that is probably secondary to marijuana use in she was high at that time 100 friends were trying to wake her up  She is otherwise stable recommending to stop using drugs including marijuana patient verbalized understand the plan DC home  MDM    Disposition  Final diagnoses:   Marijuana abuse     Time reflects when diagnosis was documented in both MDM as applicable and the Disposition within this note     Time User Action Codes Description Comment    8/21/2022  2:24 PM Jim Rae [Z68 93] Marijuana abuse       ED Disposition     ED Disposition   Discharge    Condition   Stable    Date/Time   Sun Aug 21, 2022  2:23 PM    Comment   Teofilo Beavers discharge to home/self care  Follow-up Information     Follow up With Specialties Details Why Contact Info    Elizabeth Del Valle DO Family Medicine Schedule an appointment as soon as possible for a visit in 2 days If symptoms worsen [address] [facility]  [phone]            Patient's Medications   Discharge Prescriptions    No medications on file       No discharge procedures on file      PDMP Review     None          ED Provider  Electronically Signed by           Kailee Morales MD  08/21/22 6167

## 2022-08-21 NOTE — ED NOTES
Patient admits to provider that she smoked weed this morning around 0900 hrs       Yasmin Charles  08/21/22 8899

## 2022-08-22 ENCOUNTER — OFFICE VISIT (OUTPATIENT)
Dept: URGENT CARE | Facility: CLINIC | Age: 22
End: 2022-08-22

## 2022-08-22 VITALS
DIASTOLIC BLOOD PRESSURE: 75 MMHG | OXYGEN SATURATION: 100 % | TEMPERATURE: 98.2 F | RESPIRATION RATE: 89 BRPM | HEART RATE: 72 BPM | HEIGHT: 64 IN | BODY MASS INDEX: 25.68 KG/M2 | WEIGHT: 150.4 LBS | SYSTOLIC BLOOD PRESSURE: 120 MMHG

## 2022-08-22 DIAGNOSIS — Z71.1 PHYSICALLY WELL BUT WORRIED: Primary | ICD-10-CM

## 2022-08-22 LAB
ATRIAL RATE: 88 BPM
GLUCOSE SERPL-MCNC: 94 MG/DL (ref 65–140)
P AXIS: 64 DEGREES
PR INTERVAL: 164 MS
QRS AXIS: 73 DEGREES
QRSD INTERVAL: 78 MS
QT INTERVAL: 356 MS
QTC INTERVAL: 430 MS
T WAVE AXIS: 63 DEGREES
VENTRICULAR RATE: 88 BPM

## 2022-08-22 PROCEDURE — 93010 ELECTROCARDIOGRAM REPORT: CPT | Performed by: INTERNAL MEDICINE

## 2022-08-22 PROCEDURE — 82948 REAGENT STRIP/BLOOD GLUCOSE: CPT

## 2022-08-22 PROCEDURE — G0381 LEV 2 HOSP TYPE B ED VISIT: HCPCS | Performed by: PHYSICIAN ASSISTANT

## 2022-08-22 RX ORDER — HYDROXYZINE HYDROCHLORIDE 25 MG/1
TABLET, FILM COATED ORAL
COMMUNITY
Start: 2022-06-07

## 2022-08-22 RX ORDER — NORGESTIMATE AND ETHINYL ESTRADIOL 0.25-0.035
1 KIT ORAL DAILY
COMMUNITY
Start: 2022-04-22

## 2022-08-22 RX ORDER — OMEGA-3S/DHA/EPA/FISH OIL/D3 300MG-1000
1 CAPSULE ORAL DAILY
COMMUNITY

## 2022-08-22 NOTE — PROGRESS NOTES
[facility]      NAME: Mickey Reynoso is a 23 y o  female  : 2000    MRN: [id number]  DATE: 2022  TIME: 11:00 AM    Assessment and Plan   Physically well but worried [Z71 1]  1  Physically well but worried         Patient Instructions   Glucose WNL  Otherwise stressed with patient PE WNL and she must follow up with PCP as she feels she is an undiagnosed diabetic  Educated to keep sugar candy in pocket in case low sugars  Any passing out to present to ER  Patient and friends agreeable to plan  To present to the ER if symptoms worsen  Chief Complaint     Chief Complaint   Patient presents with    blood sugar check     Doesn't trust her glucometer  History of Present Illness   Mickey Reynoso presents to the clinic with friends c/o    Patient here for blood sugar check  She reports she thinks she is an undiagnosed diabetic  Checks her sugars and says they go up and down often  The highest number I saw on her glucometer was 150s, lowest 70  She just moved to area and is trying to establish with PCP  Denies any drug use or alcohol use  Was seen at ER yesterday due to reported low sugars and passing out  Was discharged as labs returned normal per patient  Review of Systems   Review of Systems   Constitutional: Positive for fatigue  Negative for chills, diaphoresis and fever  HENT: Negative for congestion, ear discharge, ear pain and facial swelling  Eyes: Negative for photophobia, pain, discharge, redness, itching and visual disturbance  Respiratory: Negative for apnea, cough, chest tightness, shortness of breath and wheezing  Cardiovascular: Negative for chest pain and palpitations  Gastrointestinal: Negative for abdominal pain  Skin: Negative for color change, rash and wound  Neurological: Negative for dizziness and headaches  Hematological: Negative for adenopathy           Current Medications     Long-Term Medications   Medication Sig Dispense Refill    norgestimate-ethinyl estradiol (ORTHO-CYCLEN) 0 25-35 MG-MCG per tablet Take 1 tablet by mouth daily      Ferrous Gluconate 256 (28 Fe) MG TABS Take 246 mg by mouth      vitamin E 100 UNIT capsule Take 100 Units by mouth daily         Current Allergies     Allergies as of 08/22/2022    (No Known Allergies)            The following portions of the patient's history were reviewed and updated as appropriate: allergies, current medications, past family history, past medical history, past social history, past surgical history and problem list   Past Medical History:   Diagnosis Date    Asthma      History reviewed  No pertinent surgical history  Social History     Socioeconomic History    Marital status: Single     Spouse name: Not on file    Number of children: Not on file    Years of education: Not on file    Highest education level: Not on file   Occupational History    Not on file   Tobacco Use    Smoking status: Never Smoker    Smokeless tobacco: Never Used   Vaping Use    Vaping Use: Some days    Substances: Nicotine   Substance and Sexual Activity    Alcohol use: Yes     Alcohol/week: 1 0 standard drink     Types: 1 Standard drinks or equivalent per week    Drug use: Never    Sexual activity: Yes   Other Topics Concern    Not on file   Social History Narrative    Not on file     Social Determinants of Health     Financial Resource Strain: Not on file   Food Insecurity: Not on file   Transportation Needs: Not on file   Physical Activity: Not on file   Stress: Not on file   Social Connections: Not on file   Intimate Partner Violence: Not on file   Housing Stability: Not on file       Objective   /75   Pulse 72   Temp 98 2 °F (36 8 °C)   Resp (!) 89   Ht 5' 4" (1 626 m)   Wt 68 2 kg (150 lb 6 4 oz)   LMP  (LMP Unknown)   SpO2 100%   BMI 25 82 kg/m²      Physical Exam     Physical Exam  Vitals and nursing note reviewed  Constitutional:       General: She is not in acute distress  Appearance: She is well-developed  She is not diaphoretic  HENT:      Head: Normocephalic and atraumatic  Nose: Nose normal    Eyes:      General: No scleral icterus  Right eye: No discharge  Left eye: No discharge  Conjunctiva/sclera: Conjunctivae normal    Cardiovascular:      Rate and Rhythm: Normal rate and regular rhythm  Heart sounds: Normal heart sounds  No murmur heard  No friction rub  No gallop  Pulmonary:      Effort: Pulmonary effort is normal  No respiratory distress  Breath sounds: Normal breath sounds  No decreased breath sounds, wheezing, rhonchi or rales  Skin:     General: Skin is warm and dry  Coloration: Skin is not pale  Findings: No erythema or rash  Neurological:      Mental Status: She is alert and oriented to person, place, and time  Psychiatric:         Behavior: Behavior normal          Thought Content:  Thought content normal          Judgment: Judgment normal          Prasanth Rainey PA-C

## 2022-08-25 ENCOUNTER — HOSPITAL ENCOUNTER (EMERGENCY)
Facility: HOSPITAL | Age: 22
Discharge: HOME | End: 2022-08-25
Attending: EMERGENCY MEDICINE

## 2022-08-25 VITALS
HEIGHT: 64 IN | WEIGHT: 150 LBS | RESPIRATION RATE: 16 BRPM | SYSTOLIC BLOOD PRESSURE: 120 MMHG | OXYGEN SATURATION: 100 % | HEART RATE: 81 BPM | TEMPERATURE: 98.6 F | BODY MASS INDEX: 25.61 KG/M2 | DIASTOLIC BLOOD PRESSURE: 72 MMHG

## 2022-08-25 DIAGNOSIS — R42 LIGHTHEADEDNESS: Primary | ICD-10-CM

## 2022-08-25 DIAGNOSIS — R55 NEAR SYNCOPE: ICD-10-CM

## 2022-08-25 LAB
ALBUMIN SERPL BCP-MCNC: 4.5 G/DL (ref 3.5–5)
ALP SERPL-CCNC: 67 U/L (ref 34–104)
ALT SERPL W P-5'-P-CCNC: 11 U/L (ref 7–52)
ANION GAP SERPL CALCULATED.3IONS-SCNC: 7 MMOL/L (ref 4–13)
AST SERPL W P-5'-P-CCNC: 16 U/L (ref 13–39)
BASOPHILS # BLD AUTO: 0.01 THOUSANDS/ΜL (ref 0–0.1)
BASOPHILS NFR BLD AUTO: 0 % (ref 0–1)
BILIRUB SERPL-MCNC: 0.43 MG/DL (ref 0.2–1)
BUN SERPL-MCNC: 12 MG/DL (ref 5–25)
CALCIUM SERPL-MCNC: 9.7 MG/DL (ref 8.4–10.2)
CARDIAC TROPONIN I PNL SERPL HS: <2 NG/L
CHLORIDE SERPL-SCNC: 100 MMOL/L (ref 96–108)
CO2 SERPL-SCNC: 29 MMOL/L (ref 21–32)
CREAT SERPL-MCNC: 0.57 MG/DL (ref 0.6–1.3)
D DIMER PPP FEU-MCNC: 0.27 UG/ML FEU
EOSINOPHIL # BLD AUTO: 0.05 THOUSAND/ΜL (ref 0–0.61)
EOSINOPHIL NFR BLD AUTO: 1 % (ref 0–6)
ERYTHROCYTE [DISTWIDTH] IN BLOOD BY AUTOMATED COUNT: 13.2 % (ref 11.6–15.1)
GFR SERPL CREATININE-BSD FRML MDRD: 133 ML/MIN/1.73SQ M
GLUCOSE SERPL-MCNC: 93 MG/DL (ref 65–140)
HCG SERPL QL: NEGATIVE
HCT VFR BLD AUTO: 39.7 % (ref 34.8–46.1)
HGB BLD-MCNC: 13.3 G/DL (ref 11.5–15.4)
IMM GRANULOCYTES # BLD AUTO: 0.01 THOUSAND/UL (ref 0–0.2)
IMM GRANULOCYTES NFR BLD AUTO: 0 % (ref 0–2)
LYMPHOCYTES # BLD AUTO: 2.37 THOUSANDS/ΜL (ref 0.6–4.47)
LYMPHOCYTES NFR BLD AUTO: 33 % (ref 14–44)
MCH RBC QN AUTO: 28.6 PG (ref 26.8–34.3)
MCHC RBC AUTO-ENTMCNC: 33.5 G/DL (ref 31.4–37.4)
MCV RBC AUTO: 85 FL (ref 82–98)
MONOCYTES # BLD AUTO: 0.74 THOUSAND/ΜL (ref 0.17–1.22)
MONOCYTES NFR BLD AUTO: 10 % (ref 4–12)
NEUTROPHILS # BLD AUTO: 4.05 THOUSANDS/ΜL (ref 1.85–7.62)
NEUTS SEG NFR BLD AUTO: 56 % (ref 43–75)
NRBC BLD AUTO-RTO: 0 /100 WBCS
PLATELET # BLD AUTO: 234 THOUSANDS/UL (ref 149–390)
PMV BLD AUTO: 10.1 FL (ref 8.9–12.7)
POTASSIUM SERPL-SCNC: 3.6 MMOL/L (ref 3.5–5.3)
PROT SERPL-MCNC: 7.3 G/DL (ref 6.4–8.4)
RBC # BLD AUTO: 4.65 MILLION/UL (ref 3.81–5.12)
SODIUM SERPL-SCNC: 136 MMOL/L (ref 135–147)
TSH SERPL DL<=0.05 MIU/L-ACNC: 2.76 UIU/ML (ref 0.45–4.5)
WBC # BLD AUTO: 7.23 THOUSAND/UL (ref 4.31–10.16)

## 2022-08-25 PROCEDURE — 85379 FIBRIN DEGRADATION QUANT: CPT | Performed by: EMERGENCY MEDICINE

## 2022-08-25 PROCEDURE — 99284 EMERGENCY DEPT VISIT MOD MDM: CPT

## 2022-08-25 PROCEDURE — 85025 COMPLETE CBC W/AUTO DIFF WBC: CPT | Performed by: EMERGENCY MEDICINE

## 2022-08-25 PROCEDURE — 80053 COMPREHEN METABOLIC PANEL: CPT | Performed by: EMERGENCY MEDICINE

## 2022-08-25 PROCEDURE — 84484 ASSAY OF TROPONIN QUANT: CPT | Performed by: EMERGENCY MEDICINE

## 2022-08-25 PROCEDURE — 36415 COLL VENOUS BLD VENIPUNCTURE: CPT | Performed by: EMERGENCY MEDICINE

## 2022-08-25 PROCEDURE — 84443 ASSAY THYROID STIM HORMONE: CPT | Performed by: EMERGENCY MEDICINE

## 2022-08-25 PROCEDURE — 83036 HEMOGLOBIN GLYCOSYLATED A1C: CPT | Performed by: EMERGENCY MEDICINE

## 2022-08-25 PROCEDURE — 99285 EMERGENCY DEPT VISIT HI MDM: CPT | Performed by: EMERGENCY MEDICINE

## 2022-08-25 PROCEDURE — 84703 CHORIONIC GONADOTROPIN ASSAY: CPT | Performed by: EMERGENCY MEDICINE

## 2022-08-25 PROCEDURE — 96360 HYDRATION IV INFUSION INIT: CPT

## 2022-08-25 PROCEDURE — 93005 ELECTROCARDIOGRAM TRACING: CPT

## 2022-08-25 RX ADMIN — SODIUM CHLORIDE 1000 ML: 0.9 INJECTION, SOLUTION INTRAVENOUS at 21:55

## 2022-08-26 ENCOUNTER — OFFICE VISIT (OUTPATIENT)
Dept: FAMILY MEDICINE CLINIC | Facility: CLINIC | Age: 22
End: 2022-08-26

## 2022-08-26 VITALS
HEART RATE: 92 BPM | WEIGHT: 152.13 LBS | BODY MASS INDEX: 25.97 KG/M2 | SYSTOLIC BLOOD PRESSURE: 100 MMHG | TEMPERATURE: 99.2 F | DIASTOLIC BLOOD PRESSURE: 62 MMHG | HEIGHT: 64 IN | OXYGEN SATURATION: 98 %

## 2022-08-26 DIAGNOSIS — Z76.89 ENCOUNTER TO ESTABLISH CARE WITH NEW DOCTOR: Primary | ICD-10-CM

## 2022-08-26 DIAGNOSIS — Z72.0 VAPES NICOTINE CONTAINING SUBSTANCE: ICD-10-CM

## 2022-08-26 DIAGNOSIS — F33.1 MODERATE EPISODE OF RECURRENT MAJOR DEPRESSIVE DISORDER (HCC): ICD-10-CM

## 2022-08-26 DIAGNOSIS — Z30.9 ENCOUNTER FOR CONTRACEPTIVE MANAGEMENT, UNSPECIFIED TYPE: ICD-10-CM

## 2022-08-26 DIAGNOSIS — Z23 ENCOUNTER FOR IMMUNIZATION: ICD-10-CM

## 2022-08-26 DIAGNOSIS — I49.8 SINUS ARRHYTHMIA: ICD-10-CM

## 2022-08-26 DIAGNOSIS — Z71.1 PHYSICALLY WELL BUT WORRIED: ICD-10-CM

## 2022-08-26 DIAGNOSIS — Z11.4 SCREENING FOR HIV (HUMAN IMMUNODEFICIENCY VIRUS): ICD-10-CM

## 2022-08-26 DIAGNOSIS — R55 POSTURAL DIZZINESS WITH NEAR SYNCOPE: ICD-10-CM

## 2022-08-26 DIAGNOSIS — R73.09 ABNORMAL GLUCOSE: ICD-10-CM

## 2022-08-26 DIAGNOSIS — Z11.59 NEED FOR HEPATITIS C SCREENING TEST: ICD-10-CM

## 2022-08-26 DIAGNOSIS — Z11.3 SCREENING FOR STDS (SEXUALLY TRANSMITTED DISEASES): ICD-10-CM

## 2022-08-26 DIAGNOSIS — R42 POSTURAL DIZZINESS WITH NEAR SYNCOPE: ICD-10-CM

## 2022-08-26 LAB
ATRIAL RATE: 75 BPM
EST. AVERAGE GLUCOSE BLD GHB EST-MCNC: 97 MG/DL
HBA1C MFR BLD: 5 %
P AXIS: 60 DEGREES
PR INTERVAL: 158 MS
QRS AXIS: 75 DEGREES
QRSD INTERVAL: 80 MS
QT INTERVAL: 380 MS
QTC INTERVAL: 424 MS
T WAVE AXIS: 58 DEGREES
VENTRICULAR RATE: 75 BPM

## 2022-08-26 PROCEDURE — 93010 ELECTROCARDIOGRAM REPORT: CPT | Performed by: INTERNAL MEDICINE

## 2022-08-26 PROCEDURE — 99204 OFFICE O/P NEW MOD 45 MIN: CPT | Performed by: FAMILY MEDICINE

## 2022-08-26 RX ORDER — NICOTINE 21 MG/24HR
1 PATCH, TRANSDERMAL 24 HOURS TRANSDERMAL EVERY 24 HOURS
Qty: 28 PATCH | Refills: 0 | Status: SHIPPED | OUTPATIENT
Start: 2022-08-26

## 2022-08-26 NOTE — ED PROVIDER NOTES
History  Chief Complaint   Patient presents with    Dizziness     Pt reports near syncopal episodes that have been happening daily for about 1 wk; +dizziness, +lightheadedness; seen here approx 2 days ago     51-year-old female with history of asthma presents the emergency department for evaluation of near syncope  Patient reports multiple episodes of lightheadedness and near-syncope that happen both at rest and with exertion  She states these episodes have been happening over the past few days  She denies any actual syncopal event  No loss of consciousness  No seizure-like activity  She believes she has undiagnosed type 1 diabetic and has been checking her blood glucose frequently  She reports that her glucose fluctuates from hypoglycemia to hyperglycemia  No formal diagnosis has ever been made  She also endorses occasional palpitations  No chest pain  No abdominal pain, nausea or vomiting  No leg pain or swelling  No prior history of DVT or PE  She states she has an appointment with a new PCP tomorrow  No vertiginous symptoms  No headache  Prior to Admission Medications   Prescriptions Last Dose Informant Patient Reported? Taking?    Ferrous Gluconate 256 (28 Fe) MG TABS   Yes No   Sig: Take 246 mg by mouth   albuterol (ProAir HFA) 90 mcg/act inhaler   No No   Sig: Inhale 2 puffs every 4 (four) hours as needed for wheezing   cholecalciferol (VITAMIN D3) 400 units tablet   Yes No   Sig: Take 1 tablet by mouth daily   hydrOXYzine HCL (ATARAX) 25 mg tablet   Yes No   Si-2 tabs po every 6 hrs prn for anxiety   norgestimate-ethinyl estradiol (ORTHO-CYCLEN) 0 25-35 MG-MCG per tablet   Yes No   Sig: Take 1 tablet by mouth daily   sertraline (ZOLOFT) 100 mg tablet   Yes No   Sig: Take 100 mg by mouth daily   vitamin E 100 UNIT capsule   Yes No   Sig: Take 100 Units by mouth daily      Facility-Administered Medications: None       Past Medical History:   Diagnosis Date    Asthma        History reviewed  No pertinent surgical history  History reviewed  No pertinent family history  I have reviewed and agree with the history as documented  E-Cigarette/Vaping    E-Cigarette Use Current Some Day User      E-Cigarette/Vaping Substances    Nicotine Yes      Social History     Tobacco Use    Smoking status: Never Smoker    Smokeless tobacco: Never Used   Vaping Use    Vaping Use: Some days    Substances: Nicotine   Substance Use Topics    Alcohol use: Yes     Alcohol/week: 1 0 standard drink     Types: 1 Standard drinks or equivalent per week    Drug use: Never       Review of Systems   Constitutional: Negative for chills and fever  HENT: Negative for ear pain and sore throat  Eyes: Negative for pain and visual disturbance  Respiratory: Negative for cough and shortness of breath  Cardiovascular: Positive for palpitations  Negative for chest pain  Gastrointestinal: Negative for abdominal pain, nausea and vomiting  Genitourinary: Negative for dysuria and hematuria  Musculoskeletal: Negative for arthralgias and back pain  Skin: Negative for color change and rash  Neurological: Positive for light-headedness  Negative for seizures and syncope  All other systems reviewed and are negative  Physical Exam  Physical Exam  Vitals and nursing note reviewed  Constitutional:       General: She is not in acute distress  HENT:      Head: Normocephalic and atraumatic  Right Ear: External ear normal       Left Ear: External ear normal       Nose: Nose normal       Mouth/Throat:      Mouth: Mucous membranes are moist    Eyes:      Extraocular Movements: Extraocular movements intact  Conjunctiva/sclera: Conjunctivae normal       Pupils: Pupils are equal, round, and reactive to light  Cardiovascular:      Rate and Rhythm: Normal rate and regular rhythm  Pulses: Normal pulses  Heart sounds: Normal heart sounds  No murmur heard    Pulmonary:      Effort: Pulmonary effort is normal  No respiratory distress  Breath sounds: Normal breath sounds  No stridor  Abdominal:      General: Abdomen is flat  Bowel sounds are normal       Tenderness: There is no abdominal tenderness  There is no guarding or rebound  Musculoskeletal:         General: No tenderness or deformity  Normal range of motion  Cervical back: Normal range of motion and neck supple  Right lower leg: No edema  Left lower leg: No edema  Skin:     General: Skin is warm and dry  Capillary Refill: Capillary refill takes less than 2 seconds  Neurological:      General: No focal deficit present  Mental Status: She is alert and oriented to person, place, and time     Psychiatric:         Behavior: Behavior normal       Comments: Anxious         Vital Signs  ED Triage Vitals [08/25/22 2116]   Temperature Pulse Respirations Blood Pressure SpO2   98 6 °F (37 °C) 81 16 120/72 100 %      Temp Source Heart Rate Source Patient Position - Orthostatic VS BP Location FiO2 (%)   Oral Monitor Sitting Right arm --      Pain Score       No Pain           Vitals:    08/25/22 2116   BP: 120/72   Pulse: 81   Patient Position - Orthostatic VS: Sitting         Visual Acuity      ED Medications  Medications   sodium chloride 0 9 % bolus 1,000 mL (0 mL Intravenous Stopped 8/25/22 2311)       Diagnostic Studies  Results Reviewed     Procedure Component Value Units Date/Time    Comprehensive metabolic panel [[id number]]  (Abnormal) Collected: 08/25/22 2154    Lab Status: Final result Specimen: Blood from Arm, Right Updated: 08/25/22 2236     Sodium 136 mmol/L      Potassium 3 6 mmol/L      Chloride 100 mmol/L      CO2 29 mmol/L      ANION GAP 7 mmol/L      BUN 12 mg/dL      Creatinine 0 57 mg/dL      Glucose 93 mg/dL      Calcium 9 7 mg/dL      AST 16 U/L      ALT 11 U/L      Alkaline Phosphatase 67 U/L      Total Protein 7 3 g/dL      Albumin 4 5 g/dL      Total Bilirubin 0 43 mg/dL      eGFR 133 ml/min/1 73sq m Narrative:      National Kidney Disease Foundation guidelines for Chronic Kidney Disease (CKD):     Stage 1 with normal or high GFR (GFR > 90 mL/min/1 73 square meters)    Stage 2 Mild CKD (GFR = 60-89 mL/min/1 73 square meters)    Stage 3A Moderate CKD (GFR = 45-59 mL/min/1 73 square meters)    Stage 3B Moderate CKD (GFR = 30-44 mL/min/1 73 square meters)    Stage 4 Severe CKD (GFR = 15-29 mL/min/1 73 square meters)    Stage 5 End Stage CKD (GFR <15 mL/min/1 73 square meters)  Note: GFR calculation is accurate only with a steady state creatinine    TSH, 3rd generation with Free T4 reflex [[id number]]  (Normal) Collected: 08/25/22 2154    Lab Status: Final result Specimen: Blood from Arm, Right Updated: 08/25/22 2236     TSH 3RD GENERATON 2 760 uIU/mL     Narrative:      Patients undergoing fluorescein dye angiography may retain small amounts of fluorescein in the body for 48-72 hours post procedure  Samples containing fluorescein can produce falsely depressed TSH values  If the patient had this procedure,a specimen should be resubmitted post fluorescein clearance        hCG, qualitative pregnancy [[id number]]  (Normal) Collected: 08/25/22 2154    Lab Status: Final result Specimen: Blood from Arm, Right Updated: 08/25/22 2227     Preg, Serum Negative    HS Troponin 0hr (reflex protocol) [[id number]]  (Normal) Collected: 08/25/22 2154    Lab Status: Final result Specimen: Blood from Arm, Right Updated: 08/25/22 2227     hs TnI 0hr <2 ng/L     D-Dimer [[id number]]  (Normal) Collected: 08/25/22 2154    Lab Status: Final result Specimen: Blood from Arm, Right Updated: 08/25/22 2215     D-Dimer, Quant 0 27 ug/ml FEU     CBC and differential [[id number]] Collected: 08/25/22 2154    Lab Status: Final result Specimen: Blood from Arm, Right Updated: 08/25/22 2202     WBC 7 23 Thousand/uL      RBC 4 65 Million/uL      Hemoglobin 13 3 g/dL      Hematocrit 39 7 %      MCV 85 fL      MCH 28 6 pg      MCHC 33 5 g/dL      RDW 13 2 %      MPV 10 1 fL      Platelets 124 Thousands/uL      nRBC 0 /100 WBCs      Neutrophils Relative 56 %      Immat GRANS % 0 %      Lymphocytes Relative 33 %      Monocytes Relative 10 %      Eosinophils Relative 1 %      Basophils Relative 0 %      Neutrophils Absolute 4 05 Thousands/µL      Immature Grans Absolute 0 01 Thousand/uL      Lymphocytes Absolute 2 37 Thousands/µL      Monocytes Absolute 0 74 Thousand/µL      Eosinophils Absolute 0 05 Thousand/µL      Basophils Absolute 0 01 Thousands/µL     Hemoglobin A1C [[id number]] Collected: 08/25/22 2154    Lab Status:  In process Specimen: Blood from Arm, Right Updated: 08/25/22 2159                 No orders to display              Procedures  Procedures         ED Course  ED Course as of 08/26/22 0010   u Aug 25, 2022   2206 EKG interpreted by myself demonstrates normal sinus rhythm with a rate of 75, normal axis, normal intervals, normal ST segment and T-waves   2216 D-Dimer, Quant: 0 27   2227 hs TnI 0hr: <2             HEART Risk Score    Flowsheet Row Most Recent Value   Heart Score Risk Calculator    History 0 Filed at: 08/26/2022 0008   ECG 0 Filed at: 08/26/2022 0008   Age 0 Filed at: 08/26/2022 0008   Risk Factors 0 Filed at: 08/26/2022 0008   Troponin 0 Filed at: 08/26/2022 0008   HEART Score 0 Filed at: 08/26/2022 0008                            Wells' Criteria for PE    Flowsheet Row Most Recent Value   Wells' Criteria for PE    Clinical signs and symptoms of DVT 0 Filed at: 08/25/2022 2125   PE is primary diagnosis or equally likely 0 Filed at: 08/25/2022 2125   HR >100 0 Filed at: 08/25/2022 2125   Immobilization at least 3 days or Surgery in the previous 4 weeks 0 Filed at: 08/25/2022 2125   Previous, objectively diagnosed PE or DVT 0 Filed at: 08/25/2022 2125   Hemoptysis 0 Filed at: 08/25/2022 2125   Malignancy with treatment within 6 months or palliative 0 Filed at: 08/25/2022 2125   Wells' Criteria Total 0 Filed at: 08/25/2022 2125 MDM  Number of Diagnoses or Management Options  Lightheadedness  Near syncope  Diagnosis management comments: 24-year-old female presents the ED for evaluation of lightheadedness  On exam, she is anxious appearing but in no acute distress  Herself and her friends were present in the room are concerned and fixated that she may have undiagnosed diabetes  They have been checking her glucose frequently and have noticed fluctuations  Will check cardiac workup including D-dimer  Also check hemoglobin A1c  If workup unremarkable, plan to discharge home with PCP follow-up tomorrow  Labs and EKG unremarkable  Patient remains asymptomatic while in the emergency department  Hemodynamically stable  She will be discharged home  She has primary care appointment tomorrow  She was given strict return precautions  Heart score is low  Disposition  Final diagnoses:   Lightheadedness   Near syncope     Time reflects when diagnosis was documented in both MDM as applicable and the Disposition within this note     Time User Action Codes Description Comment    8/25/2022 10:37 PM Check, Jad Chicas Add [R42] Lightheadedness     8/25/2022 10:37 PM Check, Jad Chicas Add [R55] Near syncope       ED Disposition     ED Disposition   Discharge    Condition   Stable    Date/Time   Thu Aug 25, 2022 10:58 PM    Comment   Micheal Paz discharge to home/self care                 Follow-up Information     Follow up With Specialties Details Why Contact Info Additional [address] Emergency Department Emergency Medicine  If symptoms worsen [address]  [place] [id number] [phone] [facility] Emergency Department, [address] [place] [address] Kerry Zelaya, [address] [facility],  Family Medicine Schedule an appointment as soon as possible for a visit   [place] [address]  [phone]             Patient's Medications   Discharge Prescriptions No medications on file       No discharge procedures on file      PDMP Review     None          ED Provider  Electronically Signed by           Cy Fonseca MD  08/26/22 0011

## 2022-08-26 NOTE — PROGRESS NOTES
Assessment/Plan:      Diagnoses and all orders for this visit:    Encounter to establish care with new doctor    Need for hepatitis C screening test  -     Hepatitis C Antibody ([facility]); Future    Encounter for immunization  Comments:  patient declined despite counseling  Orders:  -     Cancel: TDAP VACCINE GREATER THAN OR EQUAL TO 8YO IM  -     Cancel: HPV VACCINE 9 VALENT IM    Screening for HIV (human immunodeficiency virus)  -     HIV 1/2 Antigen/Antibody (4th Generation) w Reflex [facility]; Future    Screening for STDs (sexually transmitted diseases)  -     Chlamydia/GC amplified DNA by PCR; Future    Physically well but worried  Comments:  sugars have always been normal or borderline  hab1c previously normal but repeat from yesterday ED visit is pending    Encounter for contraceptive management, unspecified type  -     Ambulatory Referral to Gynecology; Future    Moderate episode of recurrent major depressive disorder [facility])  -     Ambulatory Referral to Psychology; Future    Abnormal glucose  Comments:  advised to cut out sugary drinks  Orders:  -     Insulin, fasting; Future    Sinus arrhythmia  -     Holter monitor; Future    Postural dizziness with near syncope  Comments:  most likely secondary to poor diet/drink schedule  advised to stop the ice tea and soda and drink more water  Orders:  -     Holter monitor; Future    Vapes nicotine containing substance  -     nicotine (NICODERM CQ) 21 mg/24 hr TD 24 hr patch; Place 1 patch on the skin every 24 hours  -     nicotine polacrilex (NICORETTE) 2 mg gum; Chew 1 each (2 mg total) as needed for smoking cessation          Return in about 4 weeks (around 9/23/2022) for Annual physical with pcp       The following portions of the patient's history were reviewed and updated as appropriate: allergies, current medications, past family history, past medical history, past social history, past surgical history, and problem list      Subjective:     Patient ID: Bev Jensen Srinivasan Martin is a 23 y o  female  HPI    Era Zachary presents today to establish care  Patient doing well today  History was reviewed as below  Previous PCP listed as a Dr Meghan Clay DO after moving down here in 02/2022  Reports was in [place] prior to that  Here with boyfriend and girlfriend  Reports part of polyamorous relationship  History of being on sertraline 100 mg and atarax 25 mg  Reports was getting it from a doctor from the hospital  Previous PCP is giving refills  Was hospitalized involuntary by parents  Psychiatrist never seen outside admission  Never seen one  Doing well on the medicine  Denies SI/HI  Hx of asthma: diagnosed by ED  Reports no testing completed  Has albuterol inhaler prescribed previously  Reports not even once weekly use of albuterol    She has concerns over blood glucose both highs and lows and is checking at home  She has had 5 ED visits this year with 3 in the last month  Most recent was yesterday for near syncope without LOC  CMP was normal with glucose of 93  hba1c still pending from yesterday but she had a [id number] 5/23/2022 which was 4 9  Remaining labs CBC, TSH, d dimer, pregnancy were all negative or normal  EKG showed normal sinus rhythm per reading by ED physician  Trop neg  Reports they bought her a glucometer and reports that she had 259 Wednesday night, reports ate during the day and was protein  Reports atleast 3 hours  But she drinks only iced tea and soda everyday  Barely 1 cup of water daily  Denied change with posture  Reported history of Left ventricular hole when she was a child  Reports echo in 2020 which showed no hole  No surgical repair  PHQ-9 Depression Screening    Little interest or pleasure in doing things: 0 - not at all  Feeling down, depressed, or hopeless: 0 - not at all          Past Medical History:   Diagnosis Date    Asthma        History reviewed  No pertinent surgical history      Family History   Problem Relation Age of Onset    Hypertension Mother     No Known Problems Half-Sister     Seizures Half-Sister        Social History     Tobacco Use    Smoking status: Never Smoker    Smokeless tobacco: Never Used   Vaping Use    Vaping Use: Some days    Substances: Nicotine   Substance Use Topics    Alcohol use:  Yes     Alcohol/week: 1 0 standard drink     Types: 1 Standard drinks or equivalent per week     Comment: occ    Drug use: Yes     Types: Marijuana     Comment: once weekly      Social History     Social History Narrative    Living apartment- with BF and GF, dog diamante        Working CNA at [employer] Allergy Tongue Swelling    Pollen Extract Cough       Current Outpatient Medications on File Prior to Visit   Medication Sig Dispense Refill    albuterol (ProAir HFA) 90 mcg/act inhaler Inhale 2 puffs every 4 (four) hours as needed for wheezing 8 g 0    cholecalciferol (VITAMIN D3) 400 units tablet Take 1 tablet by mouth daily      Ferrous Gluconate 256 (28 Fe) MG TABS Take 246 mg by mouth      hydrOXYzine HCL (ATARAX) 25 mg tablet 1-2 tabs po every 6 hrs prn for anxiety      norgestimate-ethinyl estradiol (ORTHO-CYCLEN) 0 25-35 MG-MCG per tablet Take 1 tablet by mouth daily      sertraline (ZOLOFT) 100 mg tablet Take 100 mg by mouth daily      vitamin E 100 UNIT capsule Take 100 Units by mouth daily       Current Facility-Administered Medications on File Prior to Visit   Medication Dose Route Frequency Provider Last Rate Last Admin    [COMPLETED] sodium chloride 0 9 % bolus 1,000 mL  1,000 mL Intravenous Once Edwin Hernandez MD   Stopped at 08/25/22 2311          Review of Systems      Objective:    Vitals:    08/26/22 1045   BP: 100/62   BP Location: Left arm   Patient Position: Sitting   Cuff Size: Standard   Pulse: 92   Temp: 99 2 °F (37 3 °C)   TempSrc: Tympanic   SpO2: 98%   Weight: 69 kg (152 lb 2 oz)   Height: 5' 4" (1 626 m)         Physical Exam  Vitals and nursing note reviewed  Constitutional:       General: She is not in acute distress  Appearance: Normal appearance  She is not ill-appearing or toxic-appearing  HENT:      Head: Normocephalic and atraumatic  Right Ear: External ear normal       Left Ear: External ear normal    Eyes:      General: No scleral icterus  Right eye: No discharge  Left eye: No discharge  Extraocular Movements: Extraocular movements intact  Conjunctiva/sclera: Conjunctivae normal    Cardiovascular:      Rate and Rhythm: Normal rate and regular rhythm  Heart sounds: Normal heart sounds  No murmur heard  No friction rub  No gallop  Pulmonary:      Effort: Pulmonary effort is normal  No respiratory distress  Breath sounds: Normal breath sounds  No wheezing or rales  Neurological:      Mental Status: She is alert

## 2022-08-31 ENCOUNTER — APPOINTMENT (OUTPATIENT)
Dept: RADIOLOGY | Facility: HOSPITAL | Age: 22
End: 2022-08-31

## 2022-08-31 ENCOUNTER — APPOINTMENT (EMERGENCY)
Dept: CT IMAGING | Facility: HOSPITAL | Age: 22
End: 2022-08-31

## 2022-08-31 ENCOUNTER — HOSPITAL ENCOUNTER (EMERGENCY)
Facility: HOSPITAL | Age: 22
Discharge: HOME | End: 2022-08-31
Attending: EMERGENCY MEDICINE

## 2022-08-31 VITALS
SYSTOLIC BLOOD PRESSURE: 102 MMHG | OXYGEN SATURATION: 98 % | HEART RATE: 56 BPM | DIASTOLIC BLOOD PRESSURE: 64 MMHG | RESPIRATION RATE: 16 BRPM | TEMPERATURE: 98.4 F

## 2022-08-31 DIAGNOSIS — S06.0XAA CONCUSSION: ICD-10-CM

## 2022-08-31 DIAGNOSIS — R55 SYNCOPE: Primary | ICD-10-CM

## 2022-08-31 LAB
AMPHETAMINES SERPL QL SCN: NEGATIVE
ANION GAP SERPL CALCULATED.3IONS-SCNC: 6 MMOL/L (ref 4–13)
BARBITURATES UR QL: NEGATIVE
BASOPHILS # BLD AUTO: 0.02 THOUSANDS/ΜL (ref 0–0.1)
BASOPHILS NFR BLD AUTO: 0 % (ref 0–1)
BENZODIAZ UR QL: NEGATIVE
BUN SERPL-MCNC: 14 MG/DL (ref 5–25)
CALCIUM SERPL-MCNC: 9.2 MG/DL (ref 8.4–10.2)
CARDIAC TROPONIN I PNL SERPL HS: <2 NG/L
CARDIAC TROPONIN I PNL SERPL HS: <2 NG/L
CHLORIDE SERPL-SCNC: 105 MMOL/L (ref 96–108)
CO2 SERPL-SCNC: 28 MMOL/L (ref 21–32)
COCAINE UR QL: NEGATIVE
CREAT SERPL-MCNC: 0.55 MG/DL (ref 0.6–1.3)
EOSINOPHIL # BLD AUTO: 0.12 THOUSAND/ΜL (ref 0–0.61)
EOSINOPHIL NFR BLD AUTO: 2 % (ref 0–6)
ERYTHROCYTE [DISTWIDTH] IN BLOOD BY AUTOMATED COUNT: 13.4 % (ref 11.6–15.1)
EXT PREG TEST URINE: NEGATIVE
EXT. CONTROL ED NAV: NORMAL
FLUAV RNA RESP QL NAA+PROBE: NEGATIVE
FLUBV RNA RESP QL NAA+PROBE: NEGATIVE
GFR SERPL CREATININE-BSD FRML MDRD: 135 ML/MIN/1.73SQ M
GLUCOSE SERPL-MCNC: 93 MG/DL (ref 65–140)
HCT VFR BLD AUTO: 37.9 % (ref 34.8–46.1)
HGB BLD-MCNC: 13.1 G/DL (ref 11.5–15.4)
IMM GRANULOCYTES # BLD AUTO: 0.02 THOUSAND/UL (ref 0–0.2)
IMM GRANULOCYTES NFR BLD AUTO: 0 % (ref 0–2)
LYMPHOCYTES # BLD AUTO: 2.34 THOUSANDS/ΜL (ref 0.6–4.47)
LYMPHOCYTES NFR BLD AUTO: 32 % (ref 14–44)
MCH RBC QN AUTO: 28.5 PG (ref 26.8–34.3)
MCHC RBC AUTO-ENTMCNC: 34.6 G/DL (ref 31.4–37.4)
MCV RBC AUTO: 83 FL (ref 82–98)
METHADONE UR QL: NEGATIVE
MONOCYTES # BLD AUTO: 0.59 THOUSAND/ΜL (ref 0.17–1.22)
MONOCYTES NFR BLD AUTO: 8 % (ref 4–12)
NEUTROPHILS # BLD AUTO: 4.22 THOUSANDS/ΜL (ref 1.85–7.62)
NEUTS SEG NFR BLD AUTO: 58 % (ref 43–75)
NRBC BLD AUTO-RTO: 0 /100 WBCS
OPIATES UR QL SCN: NEGATIVE
OXYCODONE+OXYMORPHONE UR QL SCN: NEGATIVE
PCP UR QL: NEGATIVE
PLATELET # BLD AUTO: 256 THOUSANDS/UL (ref 149–390)
PMV BLD AUTO: 9.9 FL (ref 8.9–12.7)
POTASSIUM SERPL-SCNC: 3.8 MMOL/L (ref 3.5–5.3)
RBC # BLD AUTO: 4.59 MILLION/UL (ref 3.81–5.12)
RSV RNA RESP QL NAA+PROBE: NEGATIVE
SARS-COV-2 RNA RESP QL NAA+PROBE: NEGATIVE
SODIUM SERPL-SCNC: 139 MMOL/L (ref 135–147)
THC UR QL: POSITIVE
TSH SERPL DL<=0.05 MIU/L-ACNC: 3.06 UIU/ML (ref 0.45–4.5)
WBC # BLD AUTO: 7.31 THOUSAND/UL (ref 4.31–10.16)

## 2022-08-31 PROCEDURE — 80048 BASIC METABOLIC PNL TOTAL CA: CPT

## 2022-08-31 PROCEDURE — 80307 DRUG TEST PRSMV CHEM ANLYZR: CPT

## 2022-08-31 PROCEDURE — G1004 CDSM NDSC: HCPCS

## 2022-08-31 PROCEDURE — 84443 ASSAY THYROID STIM HORMONE: CPT

## 2022-08-31 PROCEDURE — 36415 COLL VENOUS BLD VENIPUNCTURE: CPT

## 2022-08-31 PROCEDURE — 84484 ASSAY OF TROPONIN QUANT: CPT

## 2022-08-31 PROCEDURE — 99285 EMERGENCY DEPT VISIT HI MDM: CPT | Performed by: EMERGENCY MEDICINE

## 2022-08-31 PROCEDURE — 70450 CT HEAD/BRAIN W/O DYE: CPT

## 2022-08-31 PROCEDURE — 93005 ELECTROCARDIOGRAM TRACING: CPT

## 2022-08-31 PROCEDURE — 85025 COMPLETE CBC W/AUTO DIFF WBC: CPT

## 2022-08-31 PROCEDURE — 99284 EMERGENCY DEPT VISIT MOD MDM: CPT

## 2022-08-31 PROCEDURE — 81025 URINE PREGNANCY TEST: CPT

## 2022-08-31 PROCEDURE — 71045 X-RAY EXAM CHEST 1 VIEW: CPT

## 2022-08-31 PROCEDURE — 0241U HB NFCT DS VIR RESP RNA 4 TRGT: CPT

## 2022-08-31 NOTE — ED ATTENDING ATTESTATION
8/31/2022  IRashida, , saw and evaluated the patient  I have discussed the patient with the resident/non-physician practitioner and agree with the resident's/non-physician practitioner's findings, Plan of Care, and MDM as documented in the resident's/non-physician practitioner's note, except where noted  All available labs and Radiology studies were reviewed  I was present for key portions of any procedure(s) performed by the resident/non-physician practitioner and I was immediately available to provide assistance  At this point I agree with the current assessment done in the Emergency Department  I have conducted an independent evaluation of this patient a history and physical is as follows:    ED Course     23year old female presents for evaluation of recurrent episodes of syncope  Patient reports feeling lightheaded prior to syncope and also had chest pain and headache, this was around 1:00 pm today  She has been having chest pain today - electric like and sharp, constant  Also c/o migraine type headache, band like pain and most intense behind right eye  Patient reports 2 more episodes of syncope while lying on the cough - each episode lasts less than 1 minute and was witnessed by GF  No seizure activity noted  Patient denies shortness of breath  No vomiting or diarrhea  No change in bowel habits  Patient has her menstrual cycle currently  She reports normal amount of vaginal bleeding  No dysuria or hematuria  Denies possibility of pregnancy  Patient's significant other reports the patient did strike her head on a metal bed frame and seemed to have some transient amnesia after the head injury  No previous episode of syncope -      PMHx:  Sinus arrhythmia, depression, anxiety, PTSD, asthma  Physical exam:  Patient is awake and alert, no acute distress  Pupils are equal round reactive to light  Mucous membranes are moist   Neck is supple without JVD    Heart is bradycardic, regular  No murmur or ectopy  Lungs are clear to auscultation bilateral   Abdomen is soft nontender nondistended with normoactive bowel sounds  Patient has 5/5 strength in all 4 extremities  Sensation is intact  Gait is steady  Speech is clear and appropriate  Plan:  82-year-old female presents after having 3 reported episodes of syncope with preceding symptoms of lightheadedness and dizziness  Patient does have a resting heart rate that is low  Will check CBC, CMP, EKG, COVID test   Patient does have a follow-up arranged with Cardiology for a Holter monitor  Will check CT head due to trauma occurring at the time of syncope  Dispo pending workup results      Critical Care Time  Procedures
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
## 2022-09-01 LAB
ATRIAL RATE: 55 BPM
ATRIAL RATE: 66 BPM
P AXIS: 70 DEGREES
P AXIS: 72 DEGREES
PR INTERVAL: 164 MS
PR INTERVAL: 168 MS
QRS AXIS: 86 DEGREES
QRS AXIS: 90 DEGREES
QRSD INTERVAL: 76 MS
QRSD INTERVAL: 80 MS
QT INTERVAL: 398 MS
QT INTERVAL: 428 MS
QTC INTERVAL: 409 MS
QTC INTERVAL: 417 MS
T WAVE AXIS: 77 DEGREES
T WAVE AXIS: 79 DEGREES
VENTRICULAR RATE: 55 BPM
VENTRICULAR RATE: 66 BPM

## 2022-09-01 PROCEDURE — 93010 ELECTROCARDIOGRAM REPORT: CPT | Performed by: INTERNAL MEDICINE

## 2022-09-07 ENCOUNTER — HOSPITAL ENCOUNTER (OUTPATIENT)
Dept: NON INVASIVE DIAGNOSTICS | Facility: HOSPITAL | Age: 22
Discharge: HOME | End: 2022-09-07

## 2022-09-07 DIAGNOSIS — R42 POSTURAL DIZZINESS WITH NEAR SYNCOPE: ICD-10-CM

## 2022-09-07 DIAGNOSIS — I49.8 SINUS ARRHYTHMIA: ICD-10-CM

## 2022-09-07 DIAGNOSIS — R55 POSTURAL DIZZINESS WITH NEAR SYNCOPE: ICD-10-CM

## 2022-09-07 PROCEDURE — 93226 XTRNL ECG REC<48 HR SCAN A/R: CPT

## 2022-09-07 PROCEDURE — 93225 XTRNL ECG REC<48 HRS REC: CPT

## 2022-09-09 ENCOUNTER — HOSPITAL ENCOUNTER (EMERGENCY)
Facility: HOSPITAL | Age: 22
Discharge: HOME | End: 2022-09-09
Attending: INTERNAL MEDICINE

## 2022-09-09 VITALS
OXYGEN SATURATION: 100 % | RESPIRATION RATE: 18 BRPM | DIASTOLIC BLOOD PRESSURE: 74 MMHG | SYSTOLIC BLOOD PRESSURE: 117 MMHG | TEMPERATURE: 98.7 F | HEART RATE: 80 BPM

## 2022-09-09 DIAGNOSIS — R55 SYNCOPE: Primary | ICD-10-CM

## 2022-09-09 LAB
ALBUMIN SERPL BCP-MCNC: 4.4 G/DL (ref 3.5–5)
ALP SERPL-CCNC: 61 U/L (ref 34–104)
ALT SERPL W P-5'-P-CCNC: 7 U/L (ref 7–52)
AMORPH PHOS CRY URNS QL MICRO: ABNORMAL /HPF
ANION GAP SERPL CALCULATED.3IONS-SCNC: 6 MMOL/L (ref 4–13)
AST SERPL W P-5'-P-CCNC: 11 U/L (ref 13–39)
BACTERIA UR QL AUTO: ABNORMAL /HPF
BASOPHILS # BLD AUTO: 0.01 THOUSANDS/ΜL (ref 0–0.1)
BASOPHILS NFR BLD AUTO: 0 % (ref 0–1)
BILIRUB SERPL-MCNC: 0.35 MG/DL (ref 0.2–1)
BILIRUB UR QL STRIP: NEGATIVE
BUN SERPL-MCNC: 13 MG/DL (ref 5–25)
CALCIUM SERPL-MCNC: 9.2 MG/DL (ref 8.4–10.2)
CHLORIDE SERPL-SCNC: 104 MMOL/L (ref 96–108)
CLARITY UR: ABNORMAL
CO2 SERPL-SCNC: 29 MMOL/L (ref 21–32)
COLOR UR: YELLOW
CREAT SERPL-MCNC: 0.62 MG/DL (ref 0.6–1.3)
EOSINOPHIL # BLD AUTO: 0.11 THOUSAND/ΜL (ref 0–0.61)
EOSINOPHIL NFR BLD AUTO: 2 % (ref 0–6)
ERYTHROCYTE [DISTWIDTH] IN BLOOD BY AUTOMATED COUNT: 13.2 % (ref 11.6–15.1)
EXT PREG TEST URINE: NEGATIVE
EXT. CONTROL ED NAV: NORMAL
GFR SERPL CREATININE-BSD FRML MDRD: 130 ML/MIN/1.73SQ M
GLUCOSE SERPL-MCNC: 102 MG/DL (ref 65–140)
GLUCOSE UR STRIP-MCNC: NEGATIVE MG/DL
HCT VFR BLD AUTO: 36.3 % (ref 34.8–46.1)
HGB BLD-MCNC: 12.5 G/DL (ref 11.5–15.4)
HGB UR QL STRIP.AUTO: ABNORMAL
IMM GRANULOCYTES # BLD AUTO: 0.02 THOUSAND/UL (ref 0–0.2)
IMM GRANULOCYTES NFR BLD AUTO: 0 % (ref 0–2)
KETONES UR STRIP-MCNC: NEGATIVE MG/DL
LEUKOCYTE ESTERASE UR QL STRIP: NEGATIVE
LYMPHOCYTES # BLD AUTO: 2.47 THOUSANDS/ΜL (ref 0.6–4.47)
LYMPHOCYTES NFR BLD AUTO: 35 % (ref 14–44)
MCH RBC QN AUTO: 29.3 PG (ref 26.8–34.3)
MCHC RBC AUTO-ENTMCNC: 34.4 G/DL (ref 31.4–37.4)
MCV RBC AUTO: 85 FL (ref 82–98)
MONOCYTES # BLD AUTO: 0.6 THOUSAND/ΜL (ref 0.17–1.22)
MONOCYTES NFR BLD AUTO: 9 % (ref 4–12)
MUCOUS THREADS UR QL AUTO: ABNORMAL
NEUTROPHILS # BLD AUTO: 3.85 THOUSANDS/ΜL (ref 1.85–7.62)
NEUTS SEG NFR BLD AUTO: 54 % (ref 43–75)
NITRITE UR QL STRIP: NEGATIVE
NON-SQ EPI CELLS URNS QL MICRO: ABNORMAL /HPF
NRBC BLD AUTO-RTO: 0 /100 WBCS
PH UR STRIP.AUTO: 7 [PH]
PLATELET # BLD AUTO: 268 THOUSANDS/UL (ref 149–390)
PMV BLD AUTO: 10.5 FL (ref 8.9–12.7)
POTASSIUM SERPL-SCNC: 3.9 MMOL/L (ref 3.5–5.3)
PROT SERPL-MCNC: 6.7 G/DL (ref 6.4–8.4)
PROT UR STRIP-MCNC: NEGATIVE MG/DL
RBC # BLD AUTO: 4.26 MILLION/UL (ref 3.81–5.12)
RBC #/AREA URNS AUTO: ABNORMAL /HPF
SODIUM SERPL-SCNC: 139 MMOL/L (ref 135–147)
SP GR UR STRIP.AUTO: 1.01 (ref 1–1.03)
UROBILINOGEN UR QL STRIP.AUTO: 0.2 E.U./DL
WBC # BLD AUTO: 7.06 THOUSAND/UL (ref 4.31–10.16)
WBC #/AREA URNS AUTO: ABNORMAL /HPF

## 2022-09-09 PROCEDURE — 36415 COLL VENOUS BLD VENIPUNCTURE: CPT | Performed by: INTERNAL MEDICINE

## 2022-09-09 PROCEDURE — 93005 ELECTROCARDIOGRAM TRACING: CPT

## 2022-09-09 PROCEDURE — 81025 URINE PREGNANCY TEST: CPT | Performed by: INTERNAL MEDICINE

## 2022-09-09 PROCEDURE — 81001 URINALYSIS AUTO W/SCOPE: CPT | Performed by: INTERNAL MEDICINE

## 2022-09-09 PROCEDURE — 99282 EMERGENCY DEPT VISIT SF MDM: CPT | Performed by: INTERNAL MEDICINE

## 2022-09-09 PROCEDURE — 80053 COMPREHEN METABOLIC PANEL: CPT | Performed by: INTERNAL MEDICINE

## 2022-09-09 PROCEDURE — 85025 COMPLETE CBC W/AUTO DIFF WBC: CPT | Performed by: INTERNAL MEDICINE

## 2022-09-09 PROCEDURE — 99285 EMERGENCY DEPT VISIT HI MDM: CPT

## 2022-09-09 NOTE — ED PROVIDER NOTES
History  Chief Complaint   Patient presents with    Chest Pain     Started with chest tightness earlier today    Syncope     Patient had about 2 episodes of where she passed out while sitting on the couch  No fall or injury  Patient has a hx of this where it was diagnosed anxiety      79-year-old female presents with recurrent syncopal event  She apparently has had several episodes of this before  She describes him happening several times a week now  Sometimes there episodes where the she almost passes out, other times she actually passes out  Today, her girlfriend and significant other had deep apply stone rubs several times to wake her from her event  She said she woke up a little disoriented  She had no loss of bowel or bladder  There is no description of any tonic clonic activity  She does have a  sister with epilepsy  She was evaluated for near syncope last month with a negative CT scan of her head, and otherwise normal electrolytes  She does have an outpatient primary care  She denies any illicit drug use in the last 24-48 hours  She states her depression is under control  She has no symptoms at present  She had minimal chest discomfort prior to the episode  No palpitation she is aware of  Prior to Admission Medications   Prescriptions Last Dose Informant Patient Reported? Taking?    Ferrous Gluconate 256 (28 Fe) MG TABS   Yes No   Sig: Take 246 mg by mouth   albuterol (ProAir HFA) 90 mcg/act inhaler   No No   Sig: Inhale 2 puffs every 4 (four) hours as needed for wheezing   cholecalciferol (VITAMIN D3) 400 units tablet   Yes No   Sig: Take 1 tablet by mouth daily   hydrOXYzine HCL (ATARAX) 25 mg tablet   Yes No   Si-2 tabs po every 6 hrs prn for anxiety   nicotine (NICODERM CQ) 21 mg/24 hr TD 24 hr patch   No No   Sig: Place 1 patch on the skin every 24 hours   nicotine polacrilex (NICORETTE) 2 mg gum   No No   Sig: Chew 1 each (2 mg total) as needed for smoking cessation norgestimate-ethinyl estradiol (ORTHO-CYCLEN) 0 25-35 MG-MCG per tablet   Yes No   Sig: Take 1 tablet by mouth daily   sertraline (ZOLOFT) 100 mg tablet   Yes No   Sig: Take 100 mg by mouth daily   vitamin E 100 UNIT capsule   Yes No   Sig: Take 100 Units by mouth daily      Facility-Administered Medications: None       Past Medical History:   Diagnosis Date    Anxiety     Asthma     Depression     PTSD (post-traumatic stress disorder)        History reviewed  No pertinent surgical history  Family History   Problem Relation Age of Onset    Hypertension Mother     No Known Problems Half-Sister     Seizures Half-Sister      I have reviewed and agree with the history as documented  E-Cigarette/Vaping    E-Cigarette Use Current Some Day User     Comments everyday- not sure how much      E-Cigarette/Vaping Substances    Nicotine Yes      Social History     Tobacco Use    Smoking status: Never Smoker    Smokeless tobacco: Never Used   Vaping Use    Vaping Use: Some days    Substances: Nicotine   Substance Use Topics    Alcohol use: Yes     Alcohol/week: 1 0 standard drink     Types: 1 Standard drinks or equivalent per week     Comment: occ    Drug use: Yes     Types: Marijuana     Comment: once weekly       Review of Systems   Constitutional: Negative for chills and fever  HENT: Negative for rhinorrhea and sore throat  Eyes: Negative for visual disturbance  Respiratory: Negative for cough and shortness of breath  Cardiovascular: Negative for chest pain and leg swelling  Gastrointestinal: Negative for abdominal pain, diarrhea, nausea and vomiting  Genitourinary: Negative for dysuria  Musculoskeletal: Negative for back pain and myalgias  Skin: Negative for rash  Neurological: Positive for syncope  Negative for dizziness and headaches  Psychiatric/Behavioral: Negative for confusion  All other systems reviewed and are negative        Physical Exam  Physical Exam  Vitals and nursing note reviewed  Constitutional:       Appearance: She is well-developed  HENT:      Nose: Nose normal       Mouth/Throat:      Pharynx: No oropharyngeal exudate  Eyes:      General: No scleral icterus  Conjunctiva/sclera: Conjunctivae normal       Pupils: Pupils are equal, round, and reactive to light  Neck:      Vascular: No JVD  Trachea: No tracheal deviation  Cardiovascular:      Rate and Rhythm: Normal rate and regular rhythm  Heart sounds: Normal heart sounds  No murmur heard  Pulmonary:      Effort: Pulmonary effort is normal  No respiratory distress  Breath sounds: Normal breath sounds  No wheezing or rales  Chest:      Chest wall: No mass or tenderness  Abdominal:      General: Bowel sounds are normal       Palpations: Abdomen is soft  Tenderness: There is no abdominal tenderness  There is no guarding  Musculoskeletal:         General: No tenderness  Normal range of motion  Cervical back: Normal range of motion and neck supple  Skin:     General: Skin is warm and dry  Neurological:      Mental Status: She is alert and oriented to person, place, and time  Cranial Nerves: No cranial nerve deficit  Sensory: No sensory deficit  Motor: No abnormal muscle tone        Comments: 5/5 motor, nl sens   Psychiatric:         Behavior: Behavior normal          Vital Signs  ED Triage Vitals   Temperature Pulse Respirations Blood Pressure SpO2   09/09/22 1610 09/09/22 1610 09/09/22 1611 09/09/22 1611 09/09/22 1611   98 7 °F (37 1 °C) 80 18 117/74 100 %      Temp Source Heart Rate Source Patient Position - Orthostatic VS BP Location FiO2 (%)   09/09/22 1610 09/09/22 1610 09/09/22 1611 09/09/22 1611 --   Tympanic Monitor Lying Right arm       Pain Score       --                  Vitals:    09/09/22 1610 09/09/22 1611   BP:  117/74   Pulse: 80    Patient Position - Orthostatic VS:  Lying         Visual Acuity      ED Medications  Medications - No data to display    Diagnostic Studies  Results Reviewed     Procedure Component Value Units Date/Time    POCT pregnancy, urine [[id number]]  (Normal) Resulted: 09/09/22 1825    Lab Status: Final result Updated: 09/09/22 1826     EXT PREG TEST UR (Ref: Negative) NEGATIVE     Control Valid([id number]) EXP 2/2024    Comprehensive metabolic panel [[id number]]  (Abnormal) Collected: 09/09/22 1742    Lab Status: Final result Specimen: Blood from Arm, Right Updated: 09/09/22 1804     Sodium 139 mmol/L      Potassium 3 9 mmol/L      Chloride 104 mmol/L      CO2 29 mmol/L      ANION GAP 6 mmol/L      BUN 13 mg/dL      Creatinine 0 62 mg/dL      Glucose 102 mg/dL      Calcium 9 2 mg/dL      AST 11 U/L      ALT 7 U/L      Alkaline Phosphatase 61 U/L      Total Protein 6 7 g/dL      Albumin 4 4 g/dL      Total Bilirubin 0 35 mg/dL      eGFR 130 ml/min/1 73sq m     Narrative:      [place] guidelines for Chronic Kidney Disease (CKD):     Stage 1 with normal or high GFR (GFR > 90 mL/min/1 73 square meters)    Stage 2 Mild CKD (GFR = 60-89 mL/min/1 73 square meters)    Stage 3A Moderate CKD (GFR = 45-59 mL/min/1 73 square meters)    Stage 3B Moderate CKD (GFR = 30-44 mL/min/1 73 square meters)    Stage 4 Severe CKD (GFR = 15-29 mL/min/1 73 square meters)    Stage 5 End Stage CKD (GFR <15 mL/min/1 73 square meters)  Note: GFR calculation is accurate only with a steady state creatinine    Urine Microscopic [[id number]]  (Abnormal) Collected: 09/09/22 1700    Lab Status: Final result Specimen: Urine, Clean Catch Updated: 09/09/22 1730     RBC, UA 1-2 /hpf      WBC, UA 0-1 /hpf      Epithelial Cells Occasional /hpf      Bacteria, UA None Seen /hpf      AMORPH PHOSPATES Occasional /hpf      MUCUS THREADS Occasional    UA (URINE) with reflex to Scope [[id number]]  (Abnormal) Collected: 09/09/22 1700    Lab Status: Final result Specimen: Urine, Clean Catch Updated: 09/09/22 1713     Color, UA Yellow     Clarity, UA Hazy     Specific Gravity, UA 1 015     pH, UA 7 0     Leukocytes, UA Negative     Nitrite, UA Negative     Protein, UA Negative mg/dl      Glucose, UA Negative mg/dl      Ketones, UA Negative mg/dl      Urobilinogen, UA 0 2 E U /dl      Bilirubin, UA Negative     Occult Blood, UA Trace-Intact    CBC and differential [[id number]] Collected: 09/09/22 1648    Lab Status: Final result Specimen: Blood from Arm, Left Updated: 09/09/22 1654     WBC 7 06 Thousand/uL      RBC 4 26 Million/uL      Hemoglobin 12 5 g/dL      Hematocrit 36 3 %      MCV 85 fL      MCH 29 3 pg      MCHC 34 4 g/dL      RDW 13 2 %      MPV 10 5 fL      Platelets 641 Thousands/uL      nRBC 0 /100 WBCs      Neutrophils Relative 54 %      Immat GRANS % 0 %      Lymphocytes Relative 35 %      Monocytes Relative 9 %      Eosinophils Relative 2 %      Basophils Relative 0 %      Neutrophils Absolute 3 85 Thousands/µL      Immature Grans Absolute 0 02 Thousand/uL      Lymphocytes Absolute 2 47 Thousands/µL      Monocytes Absolute 0 60 Thousand/µL      Eosinophils Absolute 0 11 Thousand/µL      Basophils Absolute 0 01 Thousands/µL                  No orders to display              Procedures  ECG 12 Lead Documentation Only    Date/Time: 9/9/2022 4:55 PM  Performed by: Moon Florian DO  Authorized by: Moon Florian DO     Indications / Diagnosis:  Syncope  ECG reviewed by me, the ED Provider: no    Patient location:  ED  Previous ECG:     Previous ECG:  Compared to current    Similarity:  No change    Comparison to cardiac monitor: No    Interpretation:     Interpretation: normal    Rate:     ECG rate assessment: normal    Rhythm:     Rhythm: sinus rhythm    Ectopy:     Ectopy: none    QRS:     QRS axis:  Normal    QRS intervals:  Normal  Conduction:     Conduction: normal    ST segments:     ST segments:  Normal  T waves:     T waves: normal               ED Course                                             MDM    Disposition  Final diagnoses: Syncope     Time reflects when diagnosis was documented in both MDM as applicable and the Disposition within this note     Time User Action Codes Description Comment    9/9/2022  6:31 PM Gtavinash Roney Add [R55] Syncope       ED Disposition     ED Disposition   Discharge    Condition   Stable    Date/Time   Fri Sep 9, 2022  6:35 PM    Comment   Chacha San discharge to home/self care  Follow-up Information     Follow up With Specialties Details Why Kaela Vides MD Family Medicine Call in 2 weeks Call to be seen within 2-3 weeks if possible [address] [place] [address]  [phone]            Discharge Medication List as of 9/9/2022  6:35 PM      CONTINUE these medications which have NOT CHANGED    Details   albuterol (ProAir HFA) 90 mcg/act inhaler Inhale 2 puffs every 4 (four) hours as needed for wheezing, Starting Thu 7/7/2022, Normal      cholecalciferol (VITAMIN D3) 400 units tablet Take 1 tablet by mouth daily, Historical Med      Ferrous Gluconate 256 (28 Fe) MG TABS Take 246 mg by mouth, Historical Med      hydrOXYzine HCL (ATARAX) 25 mg tablet 1-2 tabs po every 6 hrs prn for anxiety, Historical Med      nicotine (NICODERM CQ) 21 mg/24 hr TD 24 hr patch Place 1 patch on the skin every 24 hours, Starting Fri 8/26/2022, Normal      nicotine polacrilex (NICORETTE) 2 mg gum Chew 1 each (2 mg total) as needed for smoking cessation, Starting Fri 8/26/2022, Normal      norgestimate-ethinyl estradiol (ORTHO-CYCLEN) 0 25-35 MG-MCG per tablet Take 1 tablet by mouth daily, Starting Fri 4/22/2022, Historical Med      sertraline (ZOLOFT) 100 mg tablet Take 100 mg by mouth daily, Historical Med      vitamin E 100 UNIT capsule Take 100 Units by mouth daily, Historical Med             No discharge procedures on file      PDMP Review     None          ED Provider  Electronically Signed by           Desire Solis DO  09/09/22 [id number]

## 2022-09-10 LAB
ATRIAL RATE: 75 BPM
P AXIS: 61 DEGREES
PR INTERVAL: 160 MS
QRS AXIS: 73 DEGREES
QRSD INTERVAL: 82 MS
QT INTERVAL: 380 MS
QTC INTERVAL: 424 MS
T WAVE AXIS: 65 DEGREES
VENTRICULAR RATE: 75 BPM

## 2022-09-10 PROCEDURE — 93010 ELECTROCARDIOGRAM REPORT: CPT | Performed by: INTERNAL MEDICINE

## 2022-09-17 ENCOUNTER — HOSPITAL ENCOUNTER (EMERGENCY)
Facility: HOSPITAL | Age: 22
End: 2022-09-19
Attending: EMERGENCY MEDICINE

## 2022-09-17 DIAGNOSIS — Z00.8 ENCOUNTER FOR PSYCHOLOGICAL EVALUATION: Primary | ICD-10-CM

## 2022-09-17 LAB
AMPHETAMINES SERPL QL SCN: NEGATIVE
BACTERIA UR QL AUTO: NORMAL /HPF
BARBITURATES UR QL: NEGATIVE
BENZODIAZ UR QL: NEGATIVE
BILIRUB UR QL STRIP: NEGATIVE
CLARITY UR: CLEAR
COCAINE UR QL: NEGATIVE
COLOR UR: YELLOW
ETHANOL EXG-MCNC: 0 MG/DL
EXT PREG TEST URINE: NEGATIVE
EXT. CONTROL ED NAV: NORMAL
FLUAV RNA RESP QL NAA+PROBE: NEGATIVE
FLUBV RNA RESP QL NAA+PROBE: NEGATIVE
GLUCOSE UR STRIP-MCNC: NEGATIVE MG/DL
HGB UR QL STRIP.AUTO: ABNORMAL
KETONES UR STRIP-MCNC: NEGATIVE MG/DL
LEUKOCYTE ESTERASE UR QL STRIP: NEGATIVE
METHADONE UR QL: NEGATIVE
NITRITE UR QL STRIP: NEGATIVE
NON-SQ EPI CELLS URNS QL MICRO: NORMAL /HPF
OPIATES UR QL SCN: NEGATIVE
OXYCODONE+OXYMORPHONE UR QL SCN: NEGATIVE
PCP UR QL: NEGATIVE
PH UR STRIP.AUTO: 6 [PH]
PROT UR STRIP-MCNC: NEGATIVE MG/DL
RBC #/AREA URNS AUTO: NORMAL /HPF
RSV RNA RESP QL NAA+PROBE: NEGATIVE
SARS-COV-2 RNA RESP QL NAA+PROBE: NEGATIVE
SP GR UR STRIP.AUTO: >=1.03 (ref 1–1.03)
THC UR QL: POSITIVE
UROBILINOGEN UR QL STRIP.AUTO: 1 E.U./DL
WBC #/AREA URNS AUTO: NORMAL /HPF

## 2022-09-17 PROCEDURE — 81001 URINALYSIS AUTO W/SCOPE: CPT | Performed by: EMERGENCY MEDICINE

## 2022-09-17 PROCEDURE — 81003 URINALYSIS AUTO W/O SCOPE: CPT | Performed by: EMERGENCY MEDICINE

## 2022-09-17 PROCEDURE — 0241U HB NFCT DS VIR RESP RNA 4 TRGT: CPT | Performed by: EMERGENCY MEDICINE

## 2022-09-17 PROCEDURE — 80307 DRUG TEST PRSMV CHEM ANLYZR: CPT | Performed by: EMERGENCY MEDICINE

## 2022-09-17 PROCEDURE — 99285 EMERGENCY DEPT VISIT HI MDM: CPT

## 2022-09-17 PROCEDURE — 82075 ASSAY OF BREATH ETHANOL: CPT | Performed by: EMERGENCY MEDICINE

## 2022-09-17 PROCEDURE — 99284 EMERGENCY DEPT VISIT MOD MDM: CPT | Performed by: EMERGENCY MEDICINE

## 2022-09-17 PROCEDURE — 81025 URINE PREGNANCY TEST: CPT | Performed by: EMERGENCY MEDICINE

## 2022-09-17 RX ORDER — HYDROXYZINE 50 MG/1
25 TABLET, FILM COATED ORAL EVERY 6 HOURS PRN
Status: DISCONTINUED | OUTPATIENT
Start: 2022-09-17 | End: 2022-09-19 | Stop reason: HOSPADM

## 2022-09-17 RX ORDER — NICOTINE 21 MG/24HR
21 PATCH, TRANSDERMAL 24 HOURS TRANSDERMAL ONCE
Status: COMPLETED | OUTPATIENT
Start: 2022-09-17 | End: 2022-09-18

## 2022-09-17 RX ORDER — LORAZEPAM 1 MG/1
1 TABLET ORAL ONCE
Status: COMPLETED | OUTPATIENT
Start: 2022-09-17 | End: 2022-09-17

## 2022-09-17 RX ADMIN — LORAZEPAM 1 MG: 1 TABLET ORAL at 20:02

## 2022-09-17 RX ADMIN — NICOTINE 21 MG: 21 PATCH, EXTENDED RELEASE TRANSDERMAL at 20:03

## 2022-09-17 NOTE — LETTER
Section I - General Information    Name of Patient: Dante Oneil                 : 2000    Medicare #:____________________  Transport Date: 22 (PCS is valid for round trips on this date and for all repetitive trips in the 60-day range as noted below )  Origin: [address] [place] [address]                                                         Destination:________________________________________________  Is the pt's stay covered under Medicare Part A (PPS/DRG)     (_) YES  (_) NO  Closest appropriate facility?  (_) YES  (_) NO  If no, why is transport to more distant facility required?________________________  If hosp-hosp transfer, describe services needed at 2nd facility not available at 1st facility? _________________________________  If hospice pt, is this transport related to pt's terminal illness? (_) YES (_) NO Describe____________________________________    Section II - Medical Necessity Questionnaire  Ambulance transportation is medically necessary only if other means of transport are contraindicated or would be potentially harmful to the patient  To meet this requirement, the patient must either be "bed confined" or suffer from a condition such that transport by means other than ambulance is contraindicated by the patient's condition   The following questions must be answered by the medical professional signing below for this form to be valid:    1)  Describe the MEDICAL CONDITION (physical and/or mental) of this patient AT [address] [place] [address] that requires the patient to be transported in an ambulance and why transport by other means is contraindicated by the patient's condition:__________________________________________________________________________________________________    2) Is the patient "bed confined" as defined below?     (_) YES  (_) NO  To be "be confined" the patient must satisfy all three of the following conditions: (1) unable to get up from bed without Assistance; AND (2) unable to ambulate; AND (3) unable to sit in a chair or wheelchair  3) Can this patient safely be transported by car or wheelchair [facility] (i e , seated during transport without a medical attendant or monitoring)?   (_) YES  (_) NO    4) In addition to completing questions 1-3 above, please check any of the following conditions that apply*:  *Note: supporting documentation for any boxes checked must be maintained in the patient's medical records  (_)Contractures   (_)Non-Healed Fractures  (_)Patient is confused (_)Patient is comatose (_)Moderate/severe pain on movement (_)Danger to self/others  (_)IV meds/fluids required (_)Patient is combative(_)Need or possible need for restraints (_)DVT requires elevation of lower extremity  (_)Medical attendant required (_)Requires oxygen-unable to self administer (_)Special handling/isolation/infection control precautions required (_)Unable to tolerate seated position for time needed to transport (_)Hemodynamic monitoring required en route (_)Unable to sit in a chair or wheelchair due to decubitus ulcers or other wounds (_)Cardiac monitoring required en route (_)Morbid obesity requires additional personnel/equipment to safely handle patient (_)Orthopedic device (backboard, halo, pins, traction, brace, wedge, etc,) requiring special handling during transport (_)Other(specify)_______________________________________________    Section III - Signature of Physician or Healthcare Professional    I certify that the above information is accurate based on my evaluation of this patient, and that the medical necessity provisions of 42  40(e)(1) are met, requiring that this patient be transported by ambulance  I understand this information will be used by the Centers for Medicare and Medicaid Services (CMS) to support the determination of medical necessity for ambulance services   I represent that I am the beneficiarys attending physician; or an employee of the beneficiarys attending physician, or the hospital or facility where the beneficiary is being treated and from which the beneficiary is being transported; that I have personal knowledge of the beneficiarys condition at the time of transport; and that I meet all Medicare regulations and applicable State licensure laws for the credential indicated  (_) If this box is checked, I also certify that the patient is physically or mentally incapable of signing the ambulance service's claim and that the institution with which I am affiliated has furnished care, services, or assistance to the patient  My signature below is made on behalf of the patient pursuant to 42 CFR §424 36(b)(4)  In accordance with 42 CFR §424 37, the specific reason(s) that the patient is physically or mentally incapable of signing the claim form is as follows: _________________________________________________________________________________________________________      Signature of Physician* or Healthcare Professional______________________________________________________________  Signature Date 09/18/22 (For scheduled repetitive transports, this form is not valid for transports performed more than 60 days after this date)    Printed Name & Credentials of Physician or Healthcare Professional (MD, DO, RN, etc )________________________________  *Form must be signed by patient's attending physician for scheduled, repetitive transports   For non-repetitive, unscheduled ambulance transports, if unable to obtain the signature of the attending physician, any of the following may sign (choose appropriate option below)  (_) Physician Assistant   (_)  Clinical Nurse Specialist    (_)  Licensed Practical Nurse    (_)    (_)  Nurse Practitioner     (_)  Registered Nurse                (_)                         (_) Discharge Planner

## 2022-09-17 NOTE — ED NOTES
2022 @ 1830: This writer met with the patient with patient's girlfriend Rooseveltisidro Sonny at bedside  The patient presented to the ED due to experiencing a Depressive episode and was making jokes about wanting to die  The patient currently lives with her girlfriend and boyfriend - they are in a polyamorous relationship  The patient works as a CNA and stated that she really enjoys her job  The patient admitted "I relapsed in self-harm the other day, I cut my arm with a sewing needle " The patient is currently off of her medication (Zoloft 100mg) and was non-compliant with it due to it "making her feel numb " The patient's girlfriend stated that the patient was making jokes about death and dying, and that her mood has been fluctuating as the girlfriend expressed as "a manic depressive episode " Recently, it was the anniversary of her uncles death who took her and her siblings in when they were in foster care - he  in 2016  The patient's adoptive parents called the  on her last month for moving out, and the patient's biological mother  around her last birthday from an overdose  The patient's last inpatient stay was at [facility] which she stayed for 10 days and started the Zoloft  The patient's PCP has been managing her medication since  The patient has a history of SI with no plan or intent  No current SI, HI, but admits to SIB this past Wednesday by cutting her right forearm with a sewing needle  The patient denies legal issues, substance abuse, and vapes nicotine  The patient denies hallucinations, delusions, and paranoia  The patient's appetite is poor and her girlfriend has to remind her to eat  The patients sleep is also poor as she does not sleep through the night and will wake up at 2:00am and sometimes not fall back to sleep  Patient has a flat affect and depressed mood    Patient would frequently deflect in a joking manner when questioned about the factors which led to their presentation to the ED today  The patient vocalized that she does not feel safe going home as she fears that she may relapse in self-injurious behavior, and would like to sign herself in voluntarily  This writer formulated a 61 51 81 and presented it to the patient and explained it at length along with reading the patient her rights  The patient verbalized understanding  Obtained signatures from both the patient and MD  Will initiate bedsearch

## 2022-09-17 NOTE — ED PROVIDER NOTES
History  Chief Complaint   Patient presents with    Psychiatric Evaluation     Pt states having a depressive episode and was "Making jokes" about wanting to die      47-year-old female with history of anxiety and depression presenting to the ED for worsening depression and reportedly making jokes about hurting herself and wanted to die  Patient denies suicidal ideation at this time  Does state that she has had suicidal thoughts in the last week  Denies homicidal ideation, auditory visual hallucinations, alcohol and drug use beyond her marijuana use for anxiety  Denies any physical pain  States that a big trigger for her is the fact that she just got in contact with her mom again and that she moved out of her mom's because her mother is not okay with her sexuality  Patient has been admitted before psychiatrically is unsure if she is at the point now when she would need to be admitted  Prior to Admission Medications   Prescriptions Last Dose Informant Patient Reported? Taking?    Ferrous Gluconate 256 (28 Fe) MG TABS   Yes No   Sig: Take 246 mg by mouth   albuterol (ProAir HFA) 90 mcg/act inhaler   No No   Sig: Inhale 2 puffs every 4 (four) hours as needed for wheezing   cholecalciferol (VITAMIN D3) 400 units tablet   Yes No   Sig: Take 1 tablet by mouth daily   hydrOXYzine HCL (ATARAX) 25 mg tablet   Yes No   Si-2 tabs po every 6 hrs prn for anxiety   nicotine (NICODERM CQ) 21 mg/24 hr TD 24 hr patch   No No   Sig: Place 1 patch on the skin every 24 hours   nicotine polacrilex (NICORETTE) 2 mg gum   No No   Sig: Chew 1 each (2 mg total) as needed for smoking cessation   norgestimate-ethinyl estradiol (ORTHO-CYCLEN) 0 25-35 MG-MCG per tablet   Yes No   Sig: Take 1 tablet by mouth daily   sertraline (ZOLOFT) 100 mg tablet   Yes No   Sig: Take 100 mg by mouth daily   vitamin E 100 UNIT capsule   Yes No   Sig: Take 100 Units by mouth daily      Facility-Administered Medications: None       Past Medical History:   Diagnosis Date    Anxiety     Asthma     Depression     PTSD (post-traumatic stress disorder)        History reviewed  No pertinent surgical history  Family History   Problem Relation Age of Onset    Hypertension Mother     No Known Problems Half-Sister     Seizures Half-Sister      I have reviewed and agree with the history as documented  E-Cigarette/Vaping    E-Cigarette Use Current Some Day User     Comments everyday- not sure how much      E-Cigarette/Vaping Substances    Nicotine Yes      Social History     Tobacco Use    Smoking status: Never Smoker    Smokeless tobacco: Never Used   Vaping Use    Vaping Use: Some days    Substances: Nicotine   Substance Use Topics    Alcohol use: Yes     Alcohol/week: 1 0 standard drink     Types: 1 Standard drinks or equivalent per week     Comment: occ    Drug use: Yes     Types: Marijuana     Comment: once weekly       Review of Systems   Psychiatric/Behavioral: Positive for dysphoric mood  All other systems reviewed and are negative  Physical Exam  Physical Exam  General: VS reviewed  Appears in NAD  awake, alert  Well-nourished, well-developed  Appears stated age  Speaking normally in full sentences  Head: Normocephalic, atraumatic  Eyes: EOM-I  No diplopia  No hyphema  No subconjunctival hemorrhages  Symmetrical lids  ENT: Atraumatic external nose and ears  MMM  No malocclusion  No stridor  Normal phonation  No drooling  Normal swallowing  Neck: No JVD  CV: No pallor noted  Lungs:   No tachypnea  No respiratory distress  MSK:   FROM spontaneously  Skin: Dry, intact  Neuro: Awake, alert, GCS15, CN II-XII grossly intact  Motor grossly intact  Psychiatric/Behavioral: Appropriate mood and affect, not obviously hallucinating  Denying SI or HI at this time     Exam: deferred       Vital Signs  ED Triage Vitals   Temperature Pulse Respirations Blood Pressure SpO2   09/17/22 1827 09/17/22 1827 09/17/22 1827 09/17/22 1827 09/17/22 1827   98 °F (36 7 °C) 78 18 124/69 100 %      Temp Source Heart Rate Source Patient Position - Orthostatic VS BP Location FiO2 (%)   09/18/22 0952 09/17/22 1827 09/18/22 0952 09/18/22 0952 --   Tympanic Monitor Sitting Left arm       Pain Score       --                  Vitals:    09/17/22 1827 09/18/22 0952   BP: 124/69 113/72   Pulse: 78 86   Patient Position - Orthostatic VS:  Sitting         Visual Acuity      ED Medications  Medications   sertraline (ZOLOFT) tablet 100 mg (100 mg Oral Given 9/18/22 0950)   hydrOXYzine HCL (ATARAX) tablet 25 mg (has no administration in time range)   nicotine (NICODERM CQ) 21 mg/24 hr TD 24 hr patch 21 mg (21 mg Transdermal Medication Applied 9/17/22 2003)   LORazepam (ATIVAN) tablet 1 mg (1 mg Oral Given 9/2002)       Diagnostic Studies  Results Reviewed     Procedure Component Value Units Date/Time    FLU/RSV/COVID - if FLU/RSV clinically relevant [[id number]]  (Normal) Collected: 09/17/22 1848    Lab Status: Final result Specimen: Nares from Nose Updated: 09/17/22 1934     SARS-CoV-2 Negative     INFLUENZA A PCR Negative     INFLUENZA B PCR Negative     RSV PCR Negative    Narrative:      FOR PEDIATRIC PATIENTS - copy/paste COVID Guidelines URL to browser: [email or web address] [email or web address]    SARS-CoV-2 assay is a Nucleic Acid Amplification assay intended for the  qualitative detection of nucleic acid from SARS-CoV-2 in nasopharyngeal  swabs  Results are for the presumptive identification of SARS-CoV-2 RNA  Positive results are indicative of infection with SARS-CoV-2, the virus  causing COVID-19, but do not rule out bacterial infection or co-infection  with other viruses  Laboratories within the [place] and its  territories are required to report all positive results to the appropriate  public health authorities   Negative results do not preclude SARS-CoV-2  infection and should not be used as the sole basis for treatment or other  patient management decisions  Negative results must be combined with  clinical observations, patient history, and epidemiological information  This test has not been FDA cleared or approved  This test has been authorized by FDA under an Emergency Use Authorization  (EUA)  This test is only authorized for the duration of time the  declaration that circumstances exist justifying the authorization of the  emergency use of an in vitro diagnostic tests for detection of SARS-CoV-2  virus and/or diagnosis of COVID-19 infection under section 564(b)(1) of  the Act, 21 U  S C  884WYP-7(A)(7), unless the authorization is terminated  or revoked sooner  The test has been validated but independent review by FDA  and CLIA is pending  Test performed using [employer] GeneXpert: This RT-PCR assay targets N2,  a region unique to SARS-CoV-2  A conserved region in the E-gene was chosen  for pan-Sarbecovirus detection which includes SARS-CoV-2  Rapid drug screen, urine [[id number]]  (Abnormal) Collected: 09/17/22 1848    Lab Status: Final result Specimen: Urine, Clean Catch Updated: 09/17/22 1910     Amph/Meth UR Negative     Barbiturate Ur Negative     Benzodiazepine Urine Negative     Cocaine Urine Negative     Methadone Urine Negative     Opiate Urine Negative     PCP Ur Negative     THC Urine Positive     Oxycodone Urine Negative    Narrative:      Presumptive report  If requested, specimen will be sent to reference lab for confirmation  FOR MEDICAL PURPOSES ONLY  IF CONFIRMATION NEEDED PLEASE CONTACT THE LAB WITHIN 5 DAYS      Drug Screen Cutoff Levels:  AMPHETAMINE/METHAMPHETAMINES  1000 ng/mL  BARBITURATES     200 ng/mL  BENZODIAZEPINES     200 ng/mL  COCAINE      300 ng/mL  METHADONE      300 ng/mL  OPIATES      300 ng/mL  PHENCYCLIDINE     25 ng/mL  THC       50 ng/mL  OXYCODONE      100 ng/mL    Urine Microscopic [[id number]]  (Normal) Collected: 09/17/22 1848    Lab Status: Final result Specimen: Urine, Clean Catch Updated: 09/17/22 1910     RBC, UA 1-2 /hpf      WBC, UA None Seen /hpf      Epithelial Cells Occasional /hpf      Bacteria, UA None Seen /hpf     POCT alcohol breath test [[id number]]  (Normal) Resulted: 09/17/22 1858    Lab Status: Final result Updated: 09/17/22 1858     EXTBreath Alcohol 0    UA w Reflex to Microscopic w Reflex to Culture [[id number]]  (Abnormal) Collected: 09/17/22 1848    Lab Status: Final result Specimen: Urine, Clean Catch Updated: 09/17/22 1857     Color, UA Yellow     Clarity, UA Clear     Specific Gravity, UA >=1 030     pH, UA 6 0     Leukocytes, UA Negative     Nitrite, UA Negative     Protein, UA Negative mg/dl      Glucose, UA Negative mg/dl      Ketones, UA Negative mg/dl      Urobilinogen, UA 1 0 E U /dl      Bilirubin, UA Negative     Occult Blood, UA 3+    POCT pregnancy, urine [[id number]]  (Normal) Resulted: 09/17/22 1848    Lab Status: Final result Specimen: Urine Updated: 09/17/22 1848     EXT PREG TEST UR (Ref: Negative) negative     Control valid                 No orders to display              Procedures  Procedures         ED Course  ED Course as of 09/18/22 1223   Sat Sep 17, 2022   1937 Pt signed 201 at this time  Pending placement  MDM  Number of Diagnoses or Management Options  Diagnosis management comments: Patient does state that occasionally she will use a sewing needle to cut her wrists but has not done this recently  No obvious injury noted to her arms  Behavioral health workup, crisis evaluation  Patient does not meet criteria for involuntary commitment        Disposition  Final diagnoses:   Encounter for psychological evaluation     Time reflects when diagnosis was documented in both MDM as applicable and the Disposition within this note     Time User Action Codes Description Comment    9/17/2022  7:33 PM Jose Manuel Pisano Add [Z00 8] Encounter for psychological evaluation       ED Disposition     ED Disposition   Transfer to Behavioral Health Condition   --    Date/Time   Sat Sep 17, 2022  7:33 PM    Comment   Sofia Evans should be transferred out to [facility] and has been medically cleared  Follow-up Information    None         Patient's Medications   Discharge Prescriptions    No medications on file       No discharge procedures on file      PDMP Review     None          ED Provider  Electronically Signed by           Divya Reason, DO  09/18/22 3307

## 2022-09-17 NOTE — LETTER
[address] [address]  Dept: [phone]      EMTALA TRANSFER CONSENT    NAME Mandy Calix                                         2000                              MRN [id number]    I have been informed of my rights regarding examination, treatment, and transfer   by Dr Festus Gusman MD    Benefits: Continuity of care    Risks: Potential for delay in receiving treatment      Consent for Transfer:  I acknowledge that my medical condition has been evaluated and explained to me by the emergency department physician or other qualified medical person and/or my attending physician, who has recommended that I be transferred to the service of  Accepting Physician: Corine Oquendo at [address] Name, [address] : [place], [address], [address]  The above potential benefits of such transfer, the potential risks associated with such transfer, and the probable risks of not being transferred have been explained to me, and I fully understand them  The doctor has explained that, in my case, the benefits of transfer outweigh the risks  I agree to be transferred  I authorize the performance of emergency medical procedures and treatments upon me in both transit and upon arrival at the receiving facility  Additionally, I authorize the release of any and all medical records to the receiving facility and request they be transported with me, if possible  I understand that the safest mode of transportation during a medical emergency is an ambulance and that the Hospital advocates the use of this mode of transport  Risks of traveling to the receiving facility by car, including absence of medical control, life sustaining equipment, such as oxygen, and medical personnel has been explained to me and I fully understand them      (OSCAR CORRECT BOX BELOW)  [ X ]  I consent to the stated transfer and to be transported by ambulance/helicopter  [  ]  I consent to the stated transfer, but refuse transportation by ambulance and accept full responsibility for my transportation by car  I understand the risks of non-ambulance transfers and I exonerate the Hospital and its staff from any deterioration in my condition that results from this refusal     X___________________________________________    DATE  22  TIME________  Signature of patient or legally responsible individual signing on patient behalf           RELATIONSHIP TO PATIENT____________SELF_____________                        Provider Certification    NAME Jennifer Esposito                                         2000                              MRN [id number]    A medical screening exam was performed on the above named patient  Based on the examination:    Condition Necessitating Transfer The encounter diagnosis was Encounter for psychological evaluation  Patient Condition: The patient has been stabilized such that within reasonable medical probability, no material deterioration of the patient condition or the condition of the unborn child(armaan) is likely to result from the transfer    Reason for Transfer: Level of Care needed not available at this facility    Transfer Requirements: [employer], [address] [facility] [address], [address]   · Space available and qualified personnel available for treatment as acknowledged by Alonzo Osei [phone]  · Agreed to accept transfer and to provide appropriate medical treatment as acknowledged by       Lina Huggins  · Appropriate medical records of the examination and treatment of the patient are provided at the time of transfer   [address], [address] ___[place]____  · Transfer will be performed by qualified personnel from [facility]  and appropriate transfer equipment as required, including the use of necessary and appropriate life support measures      Provider Certification: I have examined the patient and explained the following risks and benefits of being transferred/refusing transfer to the patient/family:  The patient is stable for psychiatric transfer because they are medically stable, and is protected from harming him/herself or others during transport      Based on these reasonable risks and benefits to the patient and/or the unborn child(armaan), and based upon the information available at the time of the patients examination, I certify that the medical benefits reasonably to be expected from the provision of appropriate medical treatments at another medical facility outweigh the increasing risks, if any, to the individuals medical condition, and in the case of labor to the unborn child, from effecting the transfer      X____________________________________________ DATE 09/18/22        TIME_______      ORIGINAL - SEND TO MEDICAL RECORDS   COPY - SEND WITH PATIENT DURING TRANSFER

## 2022-09-18 RX ORDER — NICOTINE 21 MG/24HR
21 PATCH, TRANSDERMAL 24 HOURS TRANSDERMAL ONCE
Status: DISCONTINUED | OUTPATIENT
Start: 2022-09-18 | End: 2022-09-19 | Stop reason: HOSPADM

## 2022-09-18 RX ORDER — ACETAMINOPHEN 325 MG/1
650 TABLET ORAL ONCE
Status: COMPLETED | OUTPATIENT
Start: 2022-09-18 | End: 2022-09-18

## 2022-09-18 RX ADMIN — HYDROXYZINE HYDROCHLORIDE 25 MG: 50 TABLET, FILM COATED ORAL at 18:21

## 2022-09-18 RX ADMIN — SERTRALINE HYDROCHLORIDE 100 MG: 50 TABLET ORAL at 09:50

## 2022-09-18 RX ADMIN — ACETAMINOPHEN 650 MG: 325 TABLET ORAL at 13:21

## 2022-09-18 RX ADMIN — NICOTINE 21 MG: 21 PATCH, EXTENDED RELEASE TRANSDERMAL at 19:59

## 2022-09-18 RX ADMIN — NICOTINE 21 MG: 21 PATCH, EXTENDED RELEASE TRANSDERMAL at 13:22

## 2022-09-18 NOTE — ED NOTES
9/18/2022 @ 2019:  Melinda Oconnor    *Patient requesting this writer to outreach [facility]: Spoke to Evangelist, no current bed availability but she did advise to follow-up tomorrow  [facility]: No bed availability for the weekend per Intake  [facility]: Spoke to Yariel Alfaro from admissions - they asked what the patient's insurance was and there is nothing on file, or in the system  [facility]: Per Sin - no bed availability  [facility]: Continuous ringing - no answer  [facility]: Per Lansing Chava - no bed availability tonight but did advise to send referral over for review for potential discharge beds tomorrow  With verbal consent from the patient - this writer outreached the patient's adoptive mother Carla Dumont at [phone]) and obtained the patients insurance information  [facility] Preferred   ID#: [id number]  Group #: [id number]  Mental Health/Substance Abuse Line: [phone]    Tere Hurt relayed the following information  The Patient moved out from adoptive parents house 24hrs after the adoption was finalized (August 13th) and didn't speak to either of her adoptive parents for 3 weeks  Tere Hurt also informed this writer that they Patient has a therapist currently though [facility]) and she was also informed of patient's current status  This writer outreached David from [facility] and provided an update of insurance - david advised to send referral over for review

## 2022-09-18 NOTE — ED NOTES
[facility] Suicide Risk Assessment deferred, as unable to assess while patient sleeping  Behavioral Health Assessment deferred as patient is sleeping and would benefit from additional rest   Vital signs deferred until patient awake, no signs or symptoms of respiratory distress at this time  Once patient is awake and able to participate, will complete assessments         Deniz Ball RN  09/18/22 1086

## 2022-09-18 NOTE — ED NOTES
"[facility] Suicide Risk Assessment deferred, as unable to assess while patient sleeping  Behavioral Health Assessment deferred as patient is sleeping and would benefit from additional rest   Vital signs deferred until patient awake, no signs or symptoms of respiratory distress at this time  Once patient is awake and able to participate, will complete assessments         Giuseppe Marcelino RN  54/71/27 5109

## 2022-09-18 NOTE — ED NOTES
Patients girlfriend called for an update  Informed she was accepted somewhere and transport is looking to be Monday       Alessandro Gerardo  09/18/22 0647

## 2022-09-18 NOTE — ED NOTES
"[facility] Suicide Risk Assessment deferred, as unable to assess while patient sleeping  Behavioral Health Assessment deferred as patient is sleeping and would benefit from additional rest   Vital signs deferred until patient awake, no signs or symptoms of respiratory distress at this time  Once patient is awake and able to participate, will complete assessments         Yohan Lehman RN  73/77/55 0696

## 2022-09-18 NOTE — ED NOTES
Crisis prepared hospital packet, copies obtained for the record  EMTALA and Medical necessity form obtained

## 2022-09-18 NOTE — ED NOTES
"[facility] Suicide Risk Assessment deferred, as unable to assess while patient sleeping  Behavioral Health Assessment deferred as patient is sleeping and would benefit from additional rest   Vital signs deferred until patient awake, no signs or symptoms of respiratory distress at this time  Once patient is awake and able to participate, will complete assessments         Maikol Blanco RN  18/79/45 7684

## 2022-09-18 NOTE — ED NOTES
[facility] Suicide Risk Assessment deferred, as unable to assess while patient sleeping  Behavioral Health Assessment deferred as patient is sleeping and would benefit from additional rest   Vital signs deferred until patient awake, no signs or symptoms of respiratory distress at this time  Once patient is awake and able to participate, will complete assessments       Jace Mins  09/17/22 1177

## 2022-09-18 NOTE — ED CARE HANDOFF
Emergency Department Sign Out Note        Sign out and transfer of care from Dr Krystina Pollock  See Separate Emergency Department note  The patient, Henry Velasquez, was evaluated by the previous provider for depression  Workup Completed:  Patient had emergency department workup which consisted of a breath alcohol, COVID swab, urinalysis and urine drug screen, and pregnancy test   THC positive  ED Course / Workup Pending (followup): Patient currently seen by crisis and pending bed placement on a 201  Procedures  MDM        Disposition  Final diagnoses:   Encounter for psychological evaluation     Time reflects when diagnosis was documented in both MDM as applicable and the Disposition within this note     Time User Action Codes Description Comment    9/17/2022  7:33 PM Mary Chaidez Add [Z00 8] Encounter for psychological evaluation       ED Disposition     ED Disposition   Transfer to [address] [place] [address]   --    Date/Time   Sat Sep 17, 2022  7:33 PM    Comment   Henry Velasquez should be transferred out to [facility] and has been medically cleared             MD Documentation    Flowsheet Row Most Recent Value   Patient Condition The patient has been stabilized such that within reasonable medical probability, no material deterioration of the patient condition or the condition of the unborn child(armaan) is likely to result from the transfer   Reason for Transfer Level of Care needed not available at this facility   Benefits of Transfer Continuity of care   Risks of Transfer Potential for delay in receiving treatment   Accepting Physician [address] Name, [facility], [address], [address]    (Name & Tel number) Patricio Mariano [phone]   Transported by [facility] and Unit #) [facility]   Provider Certification The patient is stable for psychiatric transfer because they are medically stable, and is protected from harming him/herself or others during transport      RN Documentation    [address] Name, [address], [place], [address]    (Name & Tel number) Alea Mistry [phone]   Transport Mode Ambulance   Transported by [facility] and Unit #) [facility] of Care Basic life support   Patient Belongings Disposition Sent with patient   Transfer Date 09/19/22   Transfer Time 0830      Follow-up Information    None       Discharge Medication List as of 9/19/2022  8:29 AM      CONTINUE these medications which have NOT CHANGED    Details   albuterol (ProAir HFA) 90 mcg/act inhaler Inhale 2 puffs every 4 (four) hours as needed for wheezing, Starting Thu 7/7/2022, Normal      cholecalciferol (VITAMIN D3) 400 units tablet Take 1 tablet by mouth daily, Historical Med      Ferrous Gluconate 256 (28 Fe) MG TABS Take 246 mg by mouth, Historical Med      hydrOXYzine HCL (ATARAX) 25 mg tablet 1-2 tabs po every 6 hrs prn for anxiety, Historical Med      nicotine (NICODERM CQ) 21 mg/24 hr TD 24 hr patch Place 1 patch on the skin every 24 hours, Starting Fri 8/26/2022, Normal      nicotine polacrilex (NICORETTE) 2 mg gum Chew 1 each (2 mg total) as needed for smoking cessation, Starting Fri 8/26/2022, Normal      norgestimate-ethinyl estradiol (ORTHO-CYCLEN) 0 25-35 MG-MCG per tablet Take 1 tablet by mouth daily, Starting Fri 4/22/2022, Historical Med      sertraline (ZOLOFT) 100 mg tablet Take 100 mg by mouth daily, Historical Med      vitamin E 100 UNIT capsule Take 100 Units by mouth daily, Historical Med           No discharge procedures on file         ED Provider  Electronically Signed by     Malu Bolden ,   09/22/22 2030

## 2022-09-18 NOTE — ED NOTES
Insurance Authorization for admission:   Phone call placed to Alissa Ninokiara  Phone number: [phone]  Spoke to Rogers  8 days approved  Level of care: 201 inpatient mental health  Review on 9/26/2022  Authorization # [id number]          Current reviewer at [facility] to be  Camila Davis [phone]

## 2022-09-18 NOTE — ED NOTES
9/17/2022 @ 5117:    Carlyn Stanley from [facility] this writer back and stated that they reviewed the patient's referral and can accept her for Monday, 9/19  Patient is accepted at [facility]  Patient is accepted by Dr Joe Chandra MD per Carlyn Stanley stated that transport can be set up for anytime on Monday

## 2022-09-19 VITALS
WEIGHT: 152 LBS | DIASTOLIC BLOOD PRESSURE: 82 MMHG | TEMPERATURE: 98 F | SYSTOLIC BLOOD PRESSURE: 120 MMHG | RESPIRATION RATE: 16 BRPM | HEART RATE: 86 BPM | OXYGEN SATURATION: 100 % | BODY MASS INDEX: 26.09 KG/M2

## 2022-09-19 NOTE — ED CARE HANDOFF
Emergency Department Sign Out Note        Sign out and transfer of care from Dr Janina Elena  See Separate Emergency Department note  The patient, Almaz Jay, was evaluated by the previous provider for SI/depression, signed 61 [phone]  Workup Completed:  Ua, U preg, UDS, covid, ETOH    ED Course / Workup Pending (followup):                                      ED Course as of 09/19/22 0620   Mon Sep 19, 2022   0019 Received in sign out:     Pt is 201 for SI/depression     Medically cleared: UA, U preg, uds, covid, ETOH done    No issues during last shift    Pt is due for  to go to [facility] in am     Procedures  MDM        Disposition  Final diagnoses:   Encounter for psychological evaluation     Time reflects when diagnosis was documented in both MDM as applicable and the Disposition within this note     Time User Action Codes Description Comment    9/17/2022  7:33 PM Jose Lima Add [Z00 8] Encounter for psychological evaluation       ED Disposition     ED Disposition   Transfer to [address] [place] [address]   --    Date/Time   Sat Sep 17, 2022  7:33 PM    Comment   Almaz Jay should be transferred out to [facility] and has been medically cleared             MD Documentation    Flowsheet Row Most Recent Value   Patient Condition The patient has been stabilized such that within reasonable medical probability, no material deterioration of the patient condition or the condition of the unborn child(armaan) is likely to result from the transfer   Reason for Transfer Level of Care needed not available at this facility   Benefits of Transfer Continuity of care   Risks of Transfer Potential for delay in receiving treatment   Accepting Physician [address] Name, [facility], [facility] Sam[address] Zelaya, [address]    (Name & Tel number) Adrianne Mcgraw [phone]   Transported by [facility] and Unit #) [facility]   Provider Certification The patient is stable for psychiatric transfer because they are medically stable, and is protected from harming him/herself or others during transport      RN Documentation    [address] Name, [address], [address], [address]    (Name & Tel number) Leron Kehr [phone]   Transport Mode Ambulance   Transported by (Company and Unit #) [facility]   Level of Care Basic life support   Patient Belongings Disposition Sent with patient   Transfer Date 09/19/22   Transfer Time 0830      Follow-up Information    None       Patient's Medications   Discharge Prescriptions    No medications on file     No discharge procedures on file         ED Provider  Electronically Signed by     Barbara Linares MD  09/19/22 3744

## 2022-09-19 NOTE — ED NOTES
[facility] Suicide Risk Assessment deferred, as unable to assess while patient sleeping  Behavioral Health Assessment deferred as patient is sleeping and would benefit from additional rest   Vital signs deferred until patient awake, no signs or symptoms of respiratory distress at this time  Once patient is awake and able to participate, will complete assessments        Noemi Copeland RN  09/19/22 2229

## 2022-09-19 NOTE — ED NOTES
"[facility] Suicide Risk Assessment deferred, as unable to assess while patient sleeping  Behavioral Health Assessment deferred as patient is sleeping and would benefit from additional rest   Vital signs deferred until patient awake, no signs or symptoms of respiratory distress at this time  Once patient is awake and able to participate, will complete assessments         Chuy Lira RN  96/75/92 6561

## 2022-09-19 NOTE — ED NOTES
"[facility] Suicide Risk Assessment deferred, as unable to assess while patient sleeping  Behavioral Health Assessment deferred as patient is sleeping and would benefit from additional rest   Vital signs deferred until patient awake, no signs or symptoms of respiratory distress at this time  Once patient is awake and able to participate, will complete assessments         Giuseppe Marcelino RN  93/54/96 9495

## 2022-09-19 NOTE — ED NOTES
Patient picked up by EMS for transport to [facility], chart reviewed, no number provided to call nurse report       Abbey Burnette RN  09/19/22 0719

## 2022-09-19 NOTE — ED NOTES
"[facility] Suicide Risk Assessment deferred, as unable to assess while patient sleeping  Behavioral Health Assessment deferred as patient is sleeping and would benefit from additional rest   Vital signs deferred until patient awake, no signs or symptoms of respiratory distress at this time  Once patient is awake and able to participate, will complete assessments         Helena Hillman RN  55/61/54 0165

## 2022-09-23 ENCOUNTER — DOCUMENTATION (OUTPATIENT)
Dept: CARDIOLOGY CLINIC | Facility: CLINIC | Age: 22
End: 2022-09-23

## 2022-09-23 NOTE — PROGRESS NOTES
Indication:  Near syncope    Patient was monitored for a total of 48 hours from 8:07 a m  on 9/7/2022  A total of 371191 beats were monitored  The image quality was good  The predominant rhythm was normal sinus rhythm  Average heart rate was 81 bpm, with a range of  50 bpm in sinus bradycardia to 174 bpm in sinus tachycardia  There was about 3 hours of sinus bradycardia mostly at night  There was about 8 hours of sinus tachycardia  Ventricular ectopy consisted of about 14 PVCs only  Supraventricular ectopy consisted of about 412 beats, 0 2 % of the total monitored beats  This consisted of isolated PACs  Episodes of ectopic atrial rhythm, with controlled rates, were noted, mostly during the night/early morning hours  There was no atrial fibrillation during the monitored period  There were no other arrhythmias other than as described above  Symptoms:  Palpitations correlated with sinus rhythm/sinus tachycardia with heart rates up to 120 beats per minute  Impression:    Overall unremarkable 48 hrs of holter monitoring  Predominant rhythm was normal sinus rhythm  Normal diurnal variation of heart rate  Overall minimal supraventricular ectopy during the monitored period  Overall minimal ventricular ectopy during the monitored period  Occasional ectopic atrial rhythm was noted, more during night and the early morning hours  Symptoms of palpitations correlated with sinus rhythm/sinus tachycardia

## 2022-11-02 ENCOUNTER — APPOINTMENT (OUTPATIENT)
Dept: LAB | Facility: CLINIC | Age: 22
End: 2022-11-02

## 2022-11-02 DIAGNOSIS — Z11.3 SCREENING FOR STDS (SEXUALLY TRANSMITTED DISEASES): ICD-10-CM

## 2022-11-02 DIAGNOSIS — Z11.59 NEED FOR HEPATITIS C SCREENING TEST: ICD-10-CM

## 2022-11-02 DIAGNOSIS — Z11.4 SCREENING FOR HIV (HUMAN IMMUNODEFICIENCY VIRUS): ICD-10-CM

## 2022-11-02 LAB — HCV AB SER QL: NORMAL

## 2022-11-03 LAB
C TRACH DNA SPEC QL NAA+PROBE: NEGATIVE
HIV 1+2 AB+HIV1 P24 AG SERPL QL IA: NORMAL
N GONORRHOEA DNA SPEC QL NAA+PROBE: NEGATIVE

## 2022-11-11 ENCOUNTER — HOSPITAL ENCOUNTER (EMERGENCY)
Facility: HOSPITAL | Age: 22
End: 2022-11-12
Attending: EMERGENCY MEDICINE

## 2022-11-11 DIAGNOSIS — R45.851 SUICIDAL IDEATION: Primary | ICD-10-CM

## 2022-11-11 LAB
AMPHETAMINES SERPL QL SCN: NEGATIVE
BARBITURATES UR QL: NEGATIVE
BENZODIAZ UR QL: NEGATIVE
COCAINE UR QL: NEGATIVE
ETHANOL EXG-MCNC: 0 MG/DL
EXT PREG TEST URINE: NEGATIVE
EXT. CONTROL ED NAV: NORMAL
FLUAV RNA RESP QL NAA+PROBE: NEGATIVE
FLUBV RNA RESP QL NAA+PROBE: NEGATIVE
METHADONE UR QL: NEGATIVE
OPIATES UR QL SCN: NEGATIVE
OXYCODONE+OXYMORPHONE UR QL SCN: NEGATIVE
PCP UR QL: NEGATIVE
RSV RNA RESP QL NAA+PROBE: NEGATIVE
SARS-COV-2 RNA RESP QL NAA+PROBE: NEGATIVE
THC UR QL: POSITIVE
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
## 2022-11-11 NOTE — ED ATTENDING ATTESTATION
11/11/2022  IDaksha DO, saw and evaluated the patient  I have discussed the patient with the resident/non-physician practitioner and agree with the resident's/non-physician practitioner's findings, Plan of Care, and MDM as documented in the resident's/non-physician practitioner's note, except where noted  All available labs and Radiology studies were reviewed  I was present for key portions of any procedure(s) performed by the resident/non-physician practitioner and I was immediately available to provide assistance  At this point I agree with the current assessment done in the Emergency Department  I have conducted an independent evaluation of this patient a history and physical is as follows:    71-year-old female presents with depression and suicidal ideation  Patient denies a plan  Patient also admits that she is currently homeless  Denies physical complaints except a chronic cough the patient states is related to her allergies  On exam-no acute distress, heart regular, no respiratory distress    Plan-crisis to evaluate    ED Course         Critical Care Time  Procedures

## 2022-11-11 NOTE — ED NOTES
Per EVS, patient is not on file with [place] coverage  Patient has active coverage through [employer], verified via [address] [place] [address]  (Patient's last name is listed as Ohmacht on her insurance card)       Rosemary Rubio, TESSA  11/11/22    8971

## 2022-11-12 ENCOUNTER — HOSPITAL ENCOUNTER (INPATIENT)
Facility: HOSPITAL | Age: 22
LOS: 9 days | Discharge: HOME | End: 2022-11-21
Attending: STUDENT IN AN ORGANIZED HEALTH CARE EDUCATION/TRAINING PROGRAM | Admitting: PSYCHIATRY & NEUROLOGY

## 2022-11-12 VITALS
SYSTOLIC BLOOD PRESSURE: 99 MMHG | OXYGEN SATURATION: 99 % | TEMPERATURE: 98.2 F | HEART RATE: 71 BPM | DIASTOLIC BLOOD PRESSURE: 51 MMHG | RESPIRATION RATE: 18 BRPM

## 2022-11-12 DIAGNOSIS — J45.909 REACTIVE AIRWAY DISEASE: ICD-10-CM

## 2022-11-12 DIAGNOSIS — R45.851 SUICIDAL IDEATION: ICD-10-CM

## 2022-11-12 DIAGNOSIS — F31.9 BIPOLAR DISORDER (HCC): Primary | ICD-10-CM

## 2022-11-12 PROCEDURE — GZ59ZZZ INDIVIDUAL PSYCHOTHERAPY, PSYCHOPHYSIOLOGICAL: ICD-10-PCS | Performed by: STUDENT IN AN ORGANIZED HEALTH CARE EDUCATION/TRAINING PROGRAM

## 2022-11-12 PROCEDURE — GZHZZZZ GROUP PSYCHOTHERAPY: ICD-10-PCS | Performed by: STUDENT IN AN ORGANIZED HEALTH CARE EDUCATION/TRAINING PROGRAM

## 2022-11-12 RX ORDER — OLANZAPINE 5 MG/1
5 TABLET ORAL
Status: DISCONTINUED | OUTPATIENT
Start: 2022-11-12 | End: 2022-11-21 | Stop reason: HOSPADM

## 2022-11-12 RX ORDER — LORAZEPAM 2 MG/ML
2 INJECTION INTRAMUSCULAR EVERY 6 HOURS PRN
Status: CANCELLED | OUTPATIENT
Start: 2022-11-12

## 2022-11-12 RX ORDER — BENZTROPINE MESYLATE 1 MG/1
1 TABLET ORAL
Status: CANCELLED | OUTPATIENT
Start: 2022-11-12

## 2022-11-12 RX ORDER — HALOPERIDOL 5 MG/ML
5 INJECTION INTRAMUSCULAR
Status: DISCONTINUED | OUTPATIENT
Start: 2022-11-12 | End: 2022-11-21 | Stop reason: HOSPADM

## 2022-11-12 RX ORDER — HALOPERIDOL 1 MG/1
1 TABLET ORAL EVERY 6 HOURS PRN
Status: CANCELLED | OUTPATIENT
Start: 2022-11-12

## 2022-11-12 RX ORDER — LANOLIN ALCOHOL/MO/W.PET/CERES
3 CREAM (GRAM) TOPICAL
Status: DISCONTINUED | OUTPATIENT
Start: 2022-11-12 | End: 2022-11-21 | Stop reason: HOSPADM

## 2022-11-12 RX ORDER — MAGNESIUM HYDROXIDE/ALUMINUM HYDROXICE/SIMETHICONE 120; 1200; 1200 MG/30ML; MG/30ML; MG/30ML
30 SUSPENSION ORAL EVERY 4 HOURS PRN
Status: DISCONTINUED | OUTPATIENT
Start: 2022-11-12 | End: 2022-11-21 | Stop reason: HOSPADM

## 2022-11-12 RX ORDER — LORAZEPAM 2 MG/ML
2 INJECTION INTRAMUSCULAR EVERY 6 HOURS PRN
Status: DISCONTINUED | OUTPATIENT
Start: 2022-11-12 | End: 2022-11-21 | Stop reason: HOSPADM

## 2022-11-12 RX ORDER — BISACODYL 10 MG
10 SUPPOSITORY, RECTAL RECTAL DAILY PRN
Status: CANCELLED | OUTPATIENT
Start: 2022-11-12

## 2022-11-12 RX ORDER — BENZTROPINE MESYLATE 1 MG/ML
0.5 INJECTION INTRAMUSCULAR; INTRAVENOUS
Status: CANCELLED | OUTPATIENT
Start: 2022-11-12

## 2022-11-12 RX ORDER — POLYETHYLENE GLYCOL 3350 17 G/17G
17 POWDER, FOR SOLUTION ORAL DAILY PRN
Status: CANCELLED | OUTPATIENT
Start: 2022-11-12

## 2022-11-12 RX ORDER — ACETAMINOPHEN 325 MG/1
650 TABLET ORAL EVERY 6 HOURS PRN
Status: DISCONTINUED | OUTPATIENT
Start: 2022-11-12 | End: 2022-11-21 | Stop reason: HOSPADM

## 2022-11-12 RX ORDER — HALOPERIDOL 5 MG/1
5 TABLET ORAL
Status: CANCELLED | OUTPATIENT
Start: 2022-11-12

## 2022-11-12 RX ORDER — LANOLIN ALCOHOL/MO/W.PET/CERES
3 CREAM (GRAM) TOPICAL
Status: CANCELLED | OUTPATIENT
Start: 2022-11-12

## 2022-11-12 RX ORDER — BENZTROPINE MESYLATE 1 MG/ML
0.5 INJECTION INTRAMUSCULAR; INTRAVENOUS
Status: DISCONTINUED | OUTPATIENT
Start: 2022-11-12 | End: 2022-11-21 | Stop reason: HOSPADM

## 2022-11-12 RX ORDER — HYDROXYZINE 50 MG/1
50 TABLET, FILM COATED ORAL
Status: DISCONTINUED | OUTPATIENT
Start: 2022-11-12 | End: 2022-11-21 | Stop reason: HOSPADM

## 2022-11-12 RX ORDER — TRAZODONE HYDROCHLORIDE 50 MG/1
50 TABLET ORAL
Status: CANCELLED | OUTPATIENT
Start: 2022-11-12

## 2022-11-12 RX ORDER — MAGNESIUM HYDROXIDE/ALUMINUM HYDROXICE/SIMETHICONE 120; 1200; 1200 MG/30ML; MG/30ML; MG/30ML
30 SUSPENSION ORAL EVERY 4 HOURS PRN
Status: CANCELLED | OUTPATIENT
Start: 2022-11-12

## 2022-11-12 RX ORDER — BENZTROPINE MESYLATE 1 MG/1
1 TABLET ORAL
Status: DISCONTINUED | OUTPATIENT
Start: 2022-11-12 | End: 2022-11-21 | Stop reason: HOSPADM

## 2022-11-12 RX ORDER — BISACODYL 10 MG
10 SUPPOSITORY, RECTAL RECTAL DAILY PRN
Status: DISCONTINUED | OUTPATIENT
Start: 2022-11-12 | End: 2022-11-21 | Stop reason: HOSPADM

## 2022-11-12 RX ORDER — ACETAMINOPHEN 325 MG/1
650 TABLET ORAL EVERY 4 HOURS PRN
Status: CANCELLED | OUTPATIENT
Start: 2022-11-12

## 2022-11-12 RX ORDER — ACETAMINOPHEN 325 MG/1
975 TABLET ORAL EVERY 6 HOURS PRN
Status: DISCONTINUED | OUTPATIENT
Start: 2022-11-12 | End: 2022-11-21 | Stop reason: HOSPADM

## 2022-11-12 RX ORDER — POLYETHYLENE GLYCOL 3350 17 G/17G
17 POWDER, FOR SOLUTION ORAL DAILY PRN
Status: DISCONTINUED | OUTPATIENT
Start: 2022-11-12 | End: 2022-11-21 | Stop reason: HOSPADM

## 2022-11-12 RX ORDER — LORAZEPAM 2 MG/ML
2 INJECTION INTRAMUSCULAR
Status: CANCELLED | OUTPATIENT
Start: 2022-11-12

## 2022-11-12 RX ORDER — HYDROXYZINE HYDROCHLORIDE 25 MG/1
25 TABLET, FILM COATED ORAL
Status: DISCONTINUED | OUTPATIENT
Start: 2022-11-12 | End: 2022-11-21 | Stop reason: HOSPADM

## 2022-11-12 RX ORDER — AMOXICILLIN 250 MG
1 CAPSULE ORAL DAILY PRN
Status: DISCONTINUED | OUTPATIENT
Start: 2022-11-12 | End: 2022-11-21 | Stop reason: HOSPADM

## 2022-11-12 RX ORDER — HALOPERIDOL 1 MG/1
1 TABLET ORAL EVERY 6 HOURS PRN
Status: DISCONTINUED | OUTPATIENT
Start: 2022-11-12 | End: 2022-11-21 | Stop reason: HOSPADM

## 2022-11-12 RX ORDER — LORAZEPAM 2 MG/ML
1 INJECTION INTRAMUSCULAR
Status: DISCONTINUED | OUTPATIENT
Start: 2022-11-12 | End: 2022-11-21 | Stop reason: HOSPADM

## 2022-11-12 RX ORDER — BENZTROPINE MESYLATE 1 MG/ML
1 INJECTION INTRAMUSCULAR; INTRAVENOUS
Status: CANCELLED | OUTPATIENT
Start: 2022-11-12

## 2022-11-12 RX ORDER — ACETAMINOPHEN 325 MG/1
650 TABLET ORAL EVERY 6 HOURS PRN
Status: CANCELLED | OUTPATIENT
Start: 2022-11-12

## 2022-11-12 RX ORDER — NICOTINE 21 MG/24HR
21 PATCH, TRANSDERMAL 24 HOURS TRANSDERMAL DAILY
Status: DISCONTINUED | OUTPATIENT
Start: 2022-11-13 | End: 2022-11-21 | Stop reason: HOSPADM

## 2022-11-12 RX ORDER — HALOPERIDOL 5 MG/ML
2.5 INJECTION INTRAMUSCULAR
Status: DISCONTINUED | OUTPATIENT
Start: 2022-11-12 | End: 2022-11-21 | Stop reason: HOSPADM

## 2022-11-12 RX ORDER — ACETAMINOPHEN 325 MG/1
650 TABLET ORAL EVERY 4 HOURS PRN
Status: DISCONTINUED | OUTPATIENT
Start: 2022-11-12 | End: 2022-11-21 | Stop reason: HOSPADM

## 2022-11-12 RX ORDER — BENZTROPINE MESYLATE 1 MG/ML
1 INJECTION INTRAMUSCULAR; INTRAVENOUS
Status: DISCONTINUED | OUTPATIENT
Start: 2022-11-12 | End: 2022-11-21 | Stop reason: HOSPADM

## 2022-11-12 RX ORDER — HALOPERIDOL 5 MG/ML
5 INJECTION INTRAMUSCULAR
Status: CANCELLED | OUTPATIENT
Start: 2022-11-12

## 2022-11-12 RX ORDER — HYDROXYZINE HYDROCHLORIDE 25 MG/1
25 TABLET, FILM COATED ORAL
Status: CANCELLED | OUTPATIENT
Start: 2022-11-12

## 2022-11-12 RX ORDER — HYDROXYZINE 50 MG/1
100 TABLET, FILM COATED ORAL
Status: DISCONTINUED | OUTPATIENT
Start: 2022-11-12 | End: 2022-11-21 | Stop reason: HOSPADM

## 2022-11-12 RX ORDER — HALOPERIDOL 5 MG/1
5 TABLET ORAL
Status: DISCONTINUED | OUTPATIENT
Start: 2022-11-12 | End: 2022-11-21 | Stop reason: HOSPADM

## 2022-11-12 RX ORDER — HYDROXYZINE HYDROCHLORIDE 25 MG/1
50 TABLET, FILM COATED ORAL
Status: CANCELLED | OUTPATIENT
Start: 2022-11-12

## 2022-11-12 RX ORDER — LORAZEPAM 2 MG/ML
2 INJECTION INTRAMUSCULAR
Status: DISCONTINUED | OUTPATIENT
Start: 2022-11-12 | End: 2022-11-21 | Stop reason: HOSPADM

## 2022-11-12 RX ORDER — DIPHENHYDRAMINE HYDROCHLORIDE 50 MG/ML
50 INJECTION INTRAMUSCULAR; INTRAVENOUS EVERY 6 HOURS PRN
Status: DISCONTINUED | OUTPATIENT
Start: 2022-11-12 | End: 2022-11-21 | Stop reason: HOSPADM

## 2022-11-12 RX ORDER — ACETAMINOPHEN 325 MG/1
975 TABLET ORAL EVERY 6 HOURS PRN
Status: CANCELLED | OUTPATIENT
Start: 2022-11-12

## 2022-11-12 RX ORDER — HALOPERIDOL 5 MG/ML
2.5 INJECTION INTRAMUSCULAR
Status: CANCELLED | OUTPATIENT
Start: 2022-11-12

## 2022-11-12 RX ORDER — AMOXICILLIN 250 MG
1 CAPSULE ORAL DAILY PRN
Status: CANCELLED | OUTPATIENT
Start: 2022-11-12

## 2022-11-12 RX ORDER — LORAZEPAM 2 MG/ML
1 INJECTION INTRAMUSCULAR
Status: CANCELLED | OUTPATIENT
Start: 2022-11-12

## 2022-11-12 RX ORDER — TRAZODONE HYDROCHLORIDE 50 MG/1
50 TABLET ORAL
Status: DISCONTINUED | OUTPATIENT
Start: 2022-11-12 | End: 2022-11-21 | Stop reason: HOSPADM

## 2022-11-12 RX ORDER — OLANZAPINE 5 MG/1
5 TABLET ORAL
COMMUNITY
Start: 2022-11-01 | End: 2022-11-21

## 2022-11-12 RX ORDER — HYDROXYZINE HYDROCHLORIDE 25 MG/1
100 TABLET, FILM COATED ORAL
Status: CANCELLED | OUTPATIENT
Start: 2022-11-12

## 2022-11-12 RX ORDER — FLUOXETINE HYDROCHLORIDE 40 MG/1
CAPSULE ORAL
COMMUNITY
Start: 2022-11-01 | End: 2022-11-21

## 2022-11-12 RX ORDER — DIPHENHYDRAMINE HYDROCHLORIDE 50 MG/ML
50 INJECTION INTRAMUSCULAR; INTRAVENOUS EVERY 6 HOURS PRN
Status: CANCELLED | OUTPATIENT
Start: 2022-11-12

## 2022-11-12 RX ORDER — HALOPERIDOL 5 MG/1
2.5 TABLET ORAL
Status: DISCONTINUED | OUTPATIENT
Start: 2022-11-12 | End: 2022-11-21 | Stop reason: HOSPADM

## 2022-11-12 RX ADMIN — Medication 3 MG: at 21:31

## 2022-11-12 RX ADMIN — NICOTINE POLACRILEX 2 MG: 2 GUM, CHEWING BUCCAL at 19:02

## 2022-11-12 RX ADMIN — OLANZAPINE 5 MG: 5 TABLET, FILM COATED ORAL at 21:31

## 2022-11-12 NOTE — EMTALA/ACUTE CARE TRANSFER
[address] [place] [id number]  Dept: [phone]      EMTALA TRANSFER CONSENT    NAME Denilson Lockhart                                         2000                              MRN [id number]    I have been informed of my rights regarding examination, treatment, and transfer   by Dr Shawn Perez DO    Benefits: Other benefits (Include comment)_______________________ (stabilization of mental health)    Risks: Other: (Include comment)__________________________ (decompensation)      Transfer Request   I acknowledge that my medical condition has been evaluated and explained to me by the emergency department physician or other qualified medical person and/or my attending physician who has recommended and offered to me further medical examination and treatment  I understand the Hospital's obligation with respect to the treatment and stabilization of my emergency medical condition  I nevertheless request to be transferred  I release the Hospital, the doctor, and any other persons caring for me from all responsibility or liability for any injury or ill effects that may result from my transfer and agree to accept all responsibility for the consequences of my choice to transfer, rather than receive stabilizing treatment at the Hospital  I understand that because the transfer is my request, my insurance may not provide reimbursement for the services  The Hospital will assist and direct me and my family in how to make arrangements for transfer, but the hospital is not liable for any fees charged by the transport service  In spite of this understanding, I refuse to consent to further medical examination and treatment which has been offered to me, and request transfer to  [facility]ha [facility] Name, [address] : [facility], [place], [place]   I authorize the performance of emergency medical procedures and treatments upon me in both transit and upon arrival at the receiving facility  Additionally, I authorize the release of any and all medical records to the receiving facility and request they be transported with me, if possible  I authorize the performance of emergency medical procedures and treatments upon me in both transit and upon arrival at the receiving facility  Additionally, I authorize the release of any and all medical records to the receiving facility and request they be transported with me, if possible  I understand that the safest mode of transportation during a medical emergency is an ambulance and that the Hospital advocates the use of this mode of transport  Risks of traveling to the receiving facility by car, including absence of medical control, life sustaining equipment, such as oxygen, and medical personnel has been explained to me and I fully understand them  (OSCAR CORRECT BOX BELOW)  [  ]  I consent to the stated transfer and to be transported by ambulance/helicopter  [  ]  I consent to the stated transfer, but refuse transportation by ambulance and accept full responsibility for my transportation by car  I understand the risks of non-ambulance transfers and I exonerate the Hospital and its staff from any deterioration in my condition that results from this refusal     X___________________________________________    DATE  22  TIME________  Signature of patient or legally responsible individual signing on patient behalf           RELATIONSHIP TO PATIENT_________________________          Provider Certification    NAME Sejal Hawthorne                                        MITA 2000                              MRN [id number]    A medical screening exam was performed on the above named patient  Based on the examination:    Condition Necessitating Transfer The encounter diagnosis was Suicidal ideation      Patient Condition: The patient has been stabilized such that within reasonable medical probability, no material deterioration of the patient condition or the condition of the unborn child(armaan) is likely to result from the transfer    Reason for Transfer: Level of Care needed not available at this facility    Transfer Requirements: [address] [place] [address]   · Space available and qualified personnel available for treatment as acknowledged by Sherly Lauren [phone]  · Agreed to accept transfer and to provide appropriate medical treatment as acknowledged by       Dr Josseline Wells  · Appropriate medical records of the examination and treatment of the patient are provided at the time of transfer   [address] [place] [address], [address] _______  · Transfer will be performed by qualified personnel from [address] [place] [address]  and appropriate transfer equipment as required, including the use of necessary and appropriate life support measures  Provider Certification: I have examined the patient and explained the following risks and benefits of being transferred/refusing transfer to the patient/family:         Based on these reasonable risks and benefits to the patient and/or the unborn child(armaan), and based upon the information available at the time of the patient’s examination, I certify that the medical benefits reasonably to be expected from the provision of appropriate medical treatments at another medical facility outweigh the increasing risks, if any, to the individual’s medical condition, and in the case of labor to the unborn child, from effecting the transfer      X____________________________________________ DATE 11/12/22        TIME_______      ORIGINAL - SEND TO MEDICAL RECORDS   COPY - SEND WITH PATIENT DURING TRANSFER

## 2022-11-12 NOTE — ED NOTES
CW explained to Pt her acceptance to [address] and told her that her transport of [facility] will arrive around 530 today to take her there  Pt was gracious and thanked CW

## 2022-11-12 NOTE — NURSING NOTE
[place]   NO WALLET OR PURSE UPON ARRIVAL CONFIRMED WITH PT     Teal book bag with pts name on it in the book items below   [employer] with red hooded sweater and black winter hat   Stitch [employer] can not have   usb caord side pocket   Pink stitch tank top   Ed bag #1  Blue sweat pants   Red tank top shirt     Ed bag #2  COAT GREEN   Blue sweater hooded   BLUE PEN   iphone sticker on case from ED( upon arrival)  not this writer     Bedside   Green mint crocs   Glasses   Under loretta black pants   Black shirt wih army dog on it   Long sleeve black shirt   Snowman from frozen pj pants   Addais  Black pants with white stripes   Black jeans   Gray sweats   Black bra   Book yellow dork dairies

## 2022-11-12 NOTE — ED NOTES
Crisis Worker ([address]) called [employer] [phone] from back of card and was transferred to [facility] - [phone]  [address] went thru prompts after choosing precertification, however after several prompts the answering system told CW to call back during normal business hours 8am to 5pm EST Monday thru Fridays
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
## 2022-11-12 NOTE — ED NOTES
Pt remains asleep, no visible distress    Lights dimmed for comfort      Eller Severance  11/12/22 1274

## 2022-11-12 NOTE — ED NOTES
201 faxed to [facility] for review at [facility]       Jean-Paul Pappas, TESSA  11/11/22   0875

## 2022-11-12 NOTE — ED NOTES
Patient is accepted at [facility]  Patient is accepted by Dr Mukund Kirkpatrick per [address] [place] is arranged with [facility]  Transportation is scheduled for   Patient may go to the floor at or after 1600        Nurse report is to be called to [phone] prior to patient transfer

## 2022-11-12 NOTE — ED PROVIDER NOTES
History  Chief Complaint   Patient presents with   • Suicidal     Pt arrives by EMS with c/o suicidal thoughts of cutting herself  Pt states she has been suicidal for months but does not currently have any thoughts of harming herself at this time  Patient is a 42-year-old female, past medical history of anxiety and depression, who presents to the emergency department for suicidal ideation  Patient states she has had passive suicide ideation for awhile  She does not have a specific plan  She denies any homicidal ideation  She denies any auditory or visual hallucinations  She states that today she decided to come in because she was kicked out of her friend's house recently and she no longer has a place to live  She has no other complaints at this time  History provided by:  Patient   used: No        Prior to Admission Medications   Prescriptions Last Dose Informant Patient Reported? Taking?    Ferrous Gluconate 256 (28 Fe) MG TABS   Yes No   Sig: Take 246 mg by mouth   albuterol (ProAir HFA) 90 mcg/act inhaler   No No   Sig: Inhale 2 puffs every 4 (four) hours as needed for wheezing   cholecalciferol (VITAMIN D3) 400 units tablet   Yes No   Sig: Take 1 tablet by mouth daily   hydrOXYzine HCL (ATARAX) 25 mg tablet   Yes No   Si-2 tabs po every 6 hrs prn for anxiety   nicotine (NICODERM CQ) 21 mg/24 hr TD 24 hr patch   No No   Sig: Place 1 patch on the skin every 24 hours   nicotine polacrilex (NICORETTE) 2 mg gum   No No   Sig: Chew 1 each (2 mg total) as needed for smoking cessation   norgestimate-ethinyl estradiol (ORTHO-CYCLEN) 0 25-35 MG-MCG per tablet   Yes No   Sig: Take 1 tablet by mouth daily   sertraline (ZOLOFT) 100 mg tablet   Yes No   Sig: Take 100 mg by mouth daily   vitamin E 100 UNIT capsule   Yes No   Sig: Take 100 Units by mouth daily      Facility-Administered Medications: None       Past Medical History:   Diagnosis Date   • Anxiety    • Asthma    • Depression • PTSD (post-traumatic stress disorder)        History reviewed  No pertinent surgical history  Family History   Problem Relation Age of Onset   • Hypertension Mother    • No Known Problems Half-Sister    • Seizures Half-Sister      I have reviewed and agree with the history as documented  E-Cigarette/Vaping   • E-Cigarette Use Current Some Day User    • Comments everyday- not sure how much      E-Cigarette/Vaping Substances   • Nicotine Yes      Social History     Tobacco Use   • Smoking status: Never Smoker   • Smokeless tobacco: Never Used   Vaping Use   • Vaping Use: Some days   • Substances: Nicotine   Substance Use Topics   • Alcohol use: Yes     Alcohol/week: 1 0 standard drink     Types: 1 Standard drinks or equivalent per week     Comment: occ   • Drug use: Yes     Types: Marijuana     Comment: once weekly        Review of Systems   Constitutional: Negative for chills and fever  Respiratory: Negative for cough and shortness of breath  Cardiovascular: Negative for chest pain  Gastrointestinal: Negative for abdominal pain, diarrhea, nausea and vomiting  Musculoskeletal: Negative for back pain and neck pain  Psychiatric/Behavioral: Positive for suicidal ideas  Negative for self-injury  All other systems reviewed and are negative  Physical Exam  ED Triage Vitals [11/11/22 1623]   Temperature Pulse Respirations Blood Pressure SpO2   98 2 °F (36 8 °C) 73 18 113/71 97 %      Temp Source Heart Rate Source Patient Position - Orthostatic VS BP Location FiO2 (%)   Oral Monitor Sitting Right arm --      Pain Score       No Pain             Orthostatic Vital Signs  Vitals:    11/11/22 1623 11/11/22 2101 11/12/22 0821   BP: 113/71 91/70 99/51   Pulse: 73 70 71   Patient Position - Orthostatic VS: Sitting Lying Lying       Physical Exam  Vitals and nursing note reviewed  Constitutional:       General: She is not in acute distress  Appearance: She is well-developed  She is not diaphoretic  HENT:      Head: Normocephalic and atraumatic  Right Ear: External ear normal       Left Ear: External ear normal       Nose: Nose normal    Eyes:      General: Lids are normal  No scleral icterus  Cardiovascular:      Rate and Rhythm: Normal rate and regular rhythm  Pulmonary:      Effort: Pulmonary effort is normal  No respiratory distress  Breath sounds: Normal breath sounds  No wheezing or rales  Musculoskeletal:         General: No deformity  Normal range of motion  Cervical back: Normal range of motion and neck supple  Skin:     General: Skin is warm and dry  Neurological:      General: No focal deficit present  Mental Status: She is alert  GCS: GCS eye subscore is 4  GCS verbal subscore is 5  GCS motor subscore is 6  Cranial Nerves: No facial asymmetry  Gait: Gait is intact  Gait normal    Psychiatric:         Mood and Affect: Mood normal          Behavior: Behavior normal          Thought Content: Thought content includes suicidal ideation  Thought content does not include homicidal ideation  Thought content does not include homicidal or suicidal plan  ED Medications  Medications - No data to display    Diagnostic Studies  Results Reviewed     Procedure Component Value Units Date/Time    Rapid drug screen, urine [[id number]]  (Abnormal) Collected: 11/11/22 2017    Lab Status: Final result Specimen: Urine, Clean Catch Updated: 11/11/22 2142     Amph/Meth UR Negative     Barbiturate Ur Negative     Benzodiazepine Urine Negative     Cocaine Urine Negative     Methadone Urine Negative     Opiate Urine Negative     PCP Ur Negative     THC Urine Positive     Oxycodone Urine Negative    Narrative:      Presumptive report  If requested, specimen will be sent to reference lab for confirmation  FOR MEDICAL PURPOSES ONLY  IF CONFIRMATION NEEDED PLEASE CONTACT THE LAB WITHIN 5 DAYS      Drug Screen Cutoff Levels:  AMPHETAMINE/METHAMPHETAMINES  1000 ng/mL  BARBITURATES     200 ng/mL  BENZODIAZEPINES     200 ng/mL  COCAINE      300 ng/mL  METHADONE      300 ng/mL  OPIATES      300 ng/mL  PHENCYCLIDINE     25 ng/mL  THC       50 ng/mL  OXYCODONE      100 ng/mL    POCT pregnancy, urine [[id number]]  (Normal) Resulted: 11/11/22 2028    Lab Status: Final result Specimen: Urine Updated: 11/11/22 2028     EXT PREG TEST UR (Ref: Negative) negative     Control valid    FLU/RSV/COVID - if FLU/RSV clinically relevant [[id number]]  (Normal) Collected: 11/11/22 1821    Lab Status: Final result Specimen: Nares from Nose Updated: 11/11/22 1909     SARS-CoV-2 Negative     INFLUENZA A PCR Negative     INFLUENZA B PCR Negative     RSV PCR Negative    Narrative:      FOR PEDIATRIC PATIENTS - copy/paste COVID Guidelines URL to browser: [email or web address]    SARS-CoV-2 assay is a Nucleic Acid Amplification assay intended for the  qualitative detection of nucleic acid from SARS-CoV-2 in nasopharyngeal  swabs  Results are for the presumptive identification of SARS-CoV-2 RNA  Positive results are indicative of infection with SARS-CoV-2, the virus  causing COVID-19, but do not rule out bacterial infection or co-infection  with other viruses  Laboratories within the [place] and its  territories are required to report all positive results to the appropriate  public health authorities  Negative results do not preclude SARS-CoV-2  infection and should not be used as the sole basis for treatment or other  patient management decisions  Negative results must be combined with  clinical observations, patient history, and epidemiological information  This test has not been FDA cleared or approved  This test has been authorized by FDA under an Emergency Use Authorization  (EUA)   This test is only authorized for the duration of time the  declaration that circumstances exist justifying the authorization of the  emergency use of an in vitro diagnostic tests for detection of SARS-CoV-2  virus and/or diagnosis of COVID-19 infection under section 564(b)(1) of  the Act, 21 U  S C  261QUF-8(B)(3), unless the authorization is terminated  or revoked sooner  The test has been validated but independent review by FDA  and CLIA is pending  Test performed using [employer]: This RT-PCR assay targets N2,  a region unique to SARS-CoV-2  A conserved region in the E-gene was chosen  for pan-Sarbecovirus detection which includes SARS-CoV-2  According to CMS-2020-01-R, this platform meets the definition of high-throughput technology  POCT alcohol breath test [[id number]]  (Normal) Resulted: 11/11/22 1830    Lab Status: Final result Updated: 11/11/22 1830     EXTBreath Alcohol 0 000                 No orders to display         Procedures  Procedures      ED Course  ED Course as of 11/12/22 1022   Fri Nov 11, 2022   1600 201 signed  Patient medically cleared for psychiatric evaluation   2200 Patient signed out to Dr Daya Mcgill  Pending placement for 201  MDM  Number of Diagnoses or Management Options  Suicidal ideation  Diagnosis management comments: Patient is a 23 y o  female who presents to the ED for suicidal ideation and homelessness  On initial exam the patient is hemodynamically stable without evidence of intoxication  Patient noted to have no evidence of self-injury  Initial consideration in this patient included major depressive disorder, adjustment disorder        Patient is considered to be low to moderate risk for suicide based on consideration of risk factors, including no suicidal plan involving lethal means, no outpatient care or social support  Will consult crisis for possible 201  Will order crisis labs  Portions of the record may have been created with voice recognition software   Occasional wrong word or "sound a like" substitutions may have occurred due to the inherent limitations of voice recognition software  Read the chart carefully and recognize, using context, where substitutions have occurred  Amount and/or Complexity of Data Reviewed  Clinical lab tests: ordered    Risk of Complications, Morbidity, and/or Mortality  Presenting problems: high  Diagnostic procedures: low    Patient Progress  Patient progress: stable      Disposition  Final diagnoses:   Suicidal ideation     Time reflects when diagnosis was documented in both MDM as applicable and the Disposition within this note     Time User Action Codes Description Comment    11/11/2022  5:32 PM Sri Dates Add [O04 [id number]] Suicidal ideation       ED Disposition     ED Disposition   Transfer to [address] [place] [address]   --    Date/Time   Fri Nov 11, 2022  5:32 PM    Comment   Sejal Hawthorne should be transferred out to [facility] and has been medically cleared             MD Documentation    Flowsheet Row Most Recent Value   Patient Condition The patient has been stabilized such that within reasonable medical probability, no material deterioration of the patient condition or the condition of the unborn child(armaan) is likely to result from the transfer   Reason for Transfer Level of Care needed not available at this facility   Benefits of Transfer Other benefits (Include comment)_______________________  [facility]   Risks of Transfer Other: (Include comment)__________________________  [decompensation]   Accepting Physician Dr Odalis Lee Name, Unknown Mike Hernandez    (Name & Tel number) [facility] Offer [phone]   Transported by (Company and Unit #) [facility]   Sending MD Dr Stefany Hale      RN Documentation    [address] Name, Unknown Mike Hernandez    (Name & Tel number) [facility] Offer [phone]   Medications Reviewed with Next Provider of Service No   Transport Mode Ambulance Transported by [employer] and Unit #) [facility]   Level of Care Basic life support   Patient Belongings Disposition Sent with patient   Transfer Date 11/12/22      Follow-up Information    None         Patient's Medications   Discharge Prescriptions    No medications on file     No discharge procedures on file  PDMP Review     None           ED Provider  Attending physically available and evaluated Badger Pierre  I managed the patient along with the ED Attending      Electronically Signed by         Ainsley Willson DO  11/12/22 9695

## 2022-11-13 LAB
25(OH)D3 SERPL-MCNC: 33.8 NG/ML (ref 30–100)
CHOLEST SERPL-MCNC: 182 MG/DL
FOLATE SERPL-MCNC: >20 NG/ML (ref 3.1–17.5)
HDLC SERPL-MCNC: 52 MG/DL
LDLC SERPL CALC-MCNC: 120 MG/DL (ref 0–100)
NONHDLC SERPL-MCNC: 130 MG/DL
TRIGL SERPL-MCNC: 48 MG/DL
VIT B12 SERPL-MCNC: 452 PG/ML (ref 100–900)

## 2022-11-13 RX ORDER — FLUOXETINE HYDROCHLORIDE 20 MG/1
20 CAPSULE ORAL DAILY
Status: DISCONTINUED | OUTPATIENT
Start: 2022-11-13 | End: 2022-11-21 | Stop reason: HOSPADM

## 2022-11-13 RX ORDER — ALBUTEROL SULFATE 90 UG/1
2 AEROSOL, METERED RESPIRATORY (INHALATION) EVERY 4 HOURS PRN
Status: DISCONTINUED | OUTPATIENT
Start: 2022-11-13 | End: 2022-11-21 | Stop reason: HOSPADM

## 2022-11-13 RX ADMIN — Medication 3 MG: at 21:35

## 2022-11-13 RX ADMIN — NICOTINE POLACRILEX 2 MG: 2 GUM, CHEWING BUCCAL at 09:06

## 2022-11-13 RX ADMIN — Medication 21 MG: at 09:04

## 2022-11-13 RX ADMIN — FLUOXETINE 20 MG: 20 CAPSULE ORAL at 14:00

## 2022-11-13 RX ADMIN — INFLUENZA VIRUS VACCINE 0.5 ML: 15; 15; 15; 15 SUSPENSION INTRAMUSCULAR at 09:55

## 2022-11-13 RX ADMIN — OLANZAPINE 5 MG: 5 TABLET, FILM COATED ORAL at 21:35

## 2022-11-13 RX ADMIN — NICOTINE POLACRILEX 2 MG: 2 GUM, CHEWING BUCCAL at 14:42

## 2022-11-13 RX ADMIN — NICOTINE POLACRILEX 2 MG: 2 GUM, CHEWING BUCCAL at 21:35

## 2022-11-13 NOTE — NURSING NOTE
Pt difficult to wake for breakfast, but did get OOB and attend  Reports sleeping "hard as a rock " overnight  States melatonin and Zyprexa together are helpful  Pt brightens when talking with staff or peers

## 2022-11-13 NOTE — CONSULTS
[address]  [place] 2000, 23 y o  female MRN: [id number]  Unit/Bed#: [facility] 255-02 Encounter: [id number]  Primary Care Provider: Marium Jones MD   Date and time admitted to hospital: 11/12/2022  4:12 PM    Inpatient consult for Medical Clearance for [facility] patient  Consult performed by: Tez Viera PA-C  Consult ordered by: Amy Hair MD          Medical clearance for psychiatric admission  Assessment & Plan  • Admission labs reviewed, lipid panel acceptable  • CBC/CMP pending  • Vitals stable   • UDS positive for THC  • COVID-19 negative  • EKG pending   • Medically stable for continued inpatient psychiatric treatment based on available results      Tobacco use  Assessment & Plan  · Patient with history of tobacco use/nicotine vape, she uses NRT 21 mg patches prior to admission  · Continue NRT    PTSD (post-traumatic stress disorder)  Assessment & Plan  Treatment per psychiatry     Anxiety  Assessment & Plan  Treatment per psychiatry     * Bipolar disorder [facility])  Assessment & Plan  Treatment per psychiatry       Counseling / Coordination of Care Time: 30 minutes  Greater than 50% of total time spent on patient counseling and coordination of care  Collaboration of Care: Were Recommendations Directly Discussed with Primary Treatment Team? - No     History of Present Illness:    Song Tucker is a 23 y o  female with PMH of asthma, nicotine dependence, and use o oral OCPs who is originally admitted to the psychiatry service due to expression of SI  We are consulted for medical clearance for admission to [address] [place] [address] and treatment of underlying psychiatric illness  Discussed patient's medical history with her as above, will continue home medications at this time  Patient reports history of marijuana use, nicotine use currently on NRT patches at home, denies additional alcohol or drug use  Patient currently denies all physical symptoms  Labs and vitals are stable, based on all available data, patient appears medically stable to continue with inpatient psychiatric treatment  Review of Systems:    Review of Systems   Constitutional: Negative for chills and fever  HENT: Negative for congestion, rhinorrhea and sore throat  Eyes: Negative for visual disturbance  Respiratory: Negative for cough, chest tightness, shortness of breath and wheezing  Cardiovascular: Negative for chest pain and palpitations  Gastrointestinal: Negative for abdominal pain, constipation, diarrhea, nausea and vomiting  Genitourinary: Negative for difficulty urinating, dysuria, frequency and urgency  Musculoskeletal: Negative for arthralgias, back pain and myalgias  Skin: Negative for rash and wound  Neurological: Negative for dizziness, light-headedness and headaches  All other systems reviewed and are negative  Past Medical and Surgical History:     Past Medical History:   Diagnosis Date   • Anxiety    • Asthma    • Bipolar disorder ([facility] [address] )    • Depression    • PTSD (post-traumatic stress disorder)        No past surgical history on file  Meds/Allergies:    PTA meds:   Prior to Admission Medications   Prescriptions Last Dose Informant Patient Reported? Taking?    FLUoxetine (PROzac) 40 MG capsule   Yes No   Ferrous Gluconate 256 (28 Fe) MG TABS Not Taking at Unknown time  Yes No   Sig: Take 246 mg by mouth   Patient not taking: Reported on 2022   OLANZapine (ZyPREXA) 5 mg tablet   Yes Yes   Sig: Take 5 mg by mouth   albuterol (ProAir HFA) 90 mcg/act inhaler Past Month at Unknown time  No Yes   Sig: Inhale 2 puffs every 4 (four) hours as needed for wheezing   cholecalciferol (VITAMIN D3) 400 units tablet Not Taking at Unknown time  Yes No   Sig: Take 1 tablet by mouth daily   Patient not taking: Reported on 2022   hydrOXYzine HCL (ATARAX) 25 mg tablet   Yes No   Si-2 tabs po every 6 hrs prn for anxiety   nicotine (NICODERM CQ) 21 mg/24 hr TD 24 hr patch Past Week at Unknown time  No Yes   Sig: Place 1 patch on the skin every 24 hours   nicotine polacrilex (NICORETTE) 2 mg gum Past Week at Unknown time  No Yes   Sig: Chew 1 each (2 mg total) as needed for smoking cessation   norgestimate-ethinyl estradiol (ORTHO-CYCLEN) 0 25-35 MG-MCG per tablet   Yes No   Sig: Take 1 tablet by mouth daily   vitamin E 100 UNIT capsule Not Taking at Unknown time  Yes No   Sig: Take 100 Units by mouth daily   Patient not taking: Reported on 11/12/2022      Facility-Administered Medications: None       Allergies: Allergies   Allergen Reactions   • Kiwi Extract - Food Allergy Tongue Swelling   • Pollen Extract Cough       Social History:     Marital Status: Single    Substance Use History:   Social History     Substance and Sexual Activity   Alcohol Use Not Currently    Comment: denies -11/12/22     Social History     Tobacco Use   Smoking Status Former Smoker   Smokeless Tobacco Never Used     Social History     Substance and Sexual Activity   Drug Use Not Currently   • Types: Marijuana    Comment: quit 1 mos ago  - 11/12/22       Family History:    Family History   Problem Relation Age of Onset   • ADD / ADHD Mother    • Anxiety disorder Mother    • Depression Mother    • Psychiatric Illness Mother    • Suicide Attempts Mother    • Hypertension Mother    • Bipolar disorder Father    • No Known Problems Half-Sister    • Seizures Half-Sister        Physical Exam:     Vitals:   Blood Pressure: 114/85 (11/13/22 1257)  Pulse: 80 (11/13/22 1202)  Temperature: 98 °F (36 7 °C) (11/13/22 1202)  Temp Source: Tympanic (11/13/22 1202)  Respirations: 18 (11/13/22 1202)  Height: 5' 2" (157 5 cm) (11/12/22 1628)  Weight - Scale: 75 5 kg (166 lb 6 4 oz) (11/12/22 1628)  SpO2: 97 % (11/12/22 2013)    Physical Exam  Vitals and nursing note reviewed  Constitutional:       General: She is not in acute distress  Appearance: She is not ill-appearing     HENT:      Head: Normocephalic and atraumatic  Nose: Nose normal    Eyes:      General:         Right eye: No discharge  Left eye: No discharge  Conjunctiva/sclera: Conjunctivae normal    Cardiovascular:      Rate and Rhythm: Normal rate and regular rhythm  Heart sounds: Normal heart sounds  No murmur heard  Pulmonary:      Effort: Pulmonary effort is normal  No respiratory distress  Breath sounds: Normal breath sounds  No wheezing, rhonchi or rales  Abdominal:      General: Bowel sounds are normal       Palpations: Abdomen is soft  Tenderness: There is no abdominal tenderness  There is no guarding  Musculoskeletal:      Right lower leg: No edema  Left lower leg: No edema  Skin:     General: Skin is warm and dry  Neurological:      Mental Status: She is alert and oriented to person, place, and time  Psychiatric:         Mood and Affect: Mood normal          Behavior: Behavior normal          Additional Data:     Lab Results: I have personally reviewed pertinent reports  Lab Results   Component Value Date/Time    HGBA1C 5 0 08/25/2022 09:54 PM    HGBA1C 4 9 05/23/2022 06:00 AM           EKG, Pathology, and Other Studies Reviewed on Admission:   · EKG pending     ** Please Note: This note has been constructed using a voice recognition system   **

## 2022-11-13 NOTE — ASSESSMENT & PLAN NOTE
· Patient with history of tobacco use/nicotine vape, she uses NRT 21 mg patches prior to admission  · Continue NRT

## 2022-11-13 NOTE — TREATMENT PLAN
TREATMENT PLAN REVIEW - [address] 22 y o  2000 female MRN: [id number]    [address] [place] [address] Room / Bed: [place] [address]/[facility] 917-32 Encounter: [id number]          Admit Date/Time:  11/12/2022  4:12 PM    Treatment Team: Attending Provider: Ford Sexton MD; Registered Nurse: Padmini Gomez, RN; Registered Nurse: Meli Vargas, RN; Registered Nurse: Robina Palma, JUD; Patient Care Technician: Lolly Reyes;  Patient Care Technician: Diego Hopson; Patient Care Assistant: Brayden Ross    Diagnosis: Principal Problem:    Bipolar disorder [facility]  Active Problems:    Anxiety    PTSD (post-traumatic stress disorder)    Medical clearance for psychiatric admission    Tobacco use      Patient Strengths/Assets: ability for insight, cooperative, communication skills, compliant with medication, motivated, negotiates basic needs, patient is on a voluntary commitment, patient is willing to work on problems, reasoning ability, resourceful    Patient Barriers/Limitations: difficulty adapting, financial instability, homeless, limited support system, poor self-care    Short Term Goals: decrease in depressive symptoms, decrease in anxiety symptoms, decrease in suicidal thoughts, decrease in self abusive behaviors, ability to stay safe on the unit, ability to stay free of restraints, improvement in ability to express basic needs, improvement in insight, improvement in reasoning ability, improvement in self care, sleep improvement, improvement in appetite, mood stabilization, increase in group attendance, increase in socialization with peers on the unit, acceptance of need for psychiatric treatment, acceptance of psychiatric medications    Long Term Goals: improvement in depression, improvement in anxiety, stabilization of mood, free of suicidal thoughts, free of homicidal thoughts, no self abusive behavior, resolution of psychotic symptoms, improvement in reasoning ability, improved insight, able to express basic needs, acceptance of need for psychiatric medications, acceptance of need for psychiatric treatment, acceptance of need for psychiatric follow up after discharge, acceptance of psychiatric diagnosis, adequate self care, adequate sleep, adequate appetite, appropriate interaction with peers, appropriate interaction with family, stable living arrangements upon discharge, establishment of family support upon discharge    Progress Towards Goals: starting psychiatric medications as prescribed, improving, less depressed, less irritable, no longer suicidal, working on coping skills    Recommended Treatment: medication management, patient medication education, group therapy, milieu therapy, continued Behavioral Health psychiatric evaluation/assessment process    Treatment Frequency: daily medication monitoring, group and milieu therapy daily, monitoring through interdisciplinary rounds, monitoring through weekly patient care conferences    Expected Discharge Date:  11/18/22    Discharge Plan: discharge to home, referral for outpatient medication management with a psychiatrist, referral for outpatient psychotherapy    Treatment Plan Created/Updated By: Jordan Babin MD
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
## 2022-11-13 NOTE — ASSESSMENT & PLAN NOTE
• Admission labs reviewed, lipid panel acceptable  • CBC/CMP pending  • Vitals stable   • UDS positive for THC  • COVID-19 negative  • EKG pending   • Medically stable for continued inpatient psychiatric treatment based on available results
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
## 2022-11-13 NOTE — NURSING NOTE
At approximately 1215 pt pressed call bell in bathroom  When staff responded pt c/o "room spinning" feeling dizzy when standing after voiding  VS were stable 114/85 , 73  AO x 3, speaking clearly, no double vision  With staff visualizing, pt walked from BR to bed and sat on edge of bed, drank 16oz water, reporting improvement  Pt reports fluctuating anxiety, states meeting with psychiatrist this morning brought back memories  Pt has been visible and social, attending groups  Denies SI, HI, AVH

## 2022-11-14 LAB — RPR SER QL: NORMAL

## 2022-11-14 RX ADMIN — Medication 3 MG: at 21:31

## 2022-11-14 RX ADMIN — Medication 21 MG: at 08:56

## 2022-11-14 RX ADMIN — NICOTINE POLACRILEX 2 MG: 2 GUM, CHEWING BUCCAL at 18:42

## 2022-11-14 RX ADMIN — FLUOXETINE 20 MG: 20 CAPSULE ORAL at 08:55

## 2022-11-14 RX ADMIN — ACETAMINOPHEN 650 MG: 325 TABLET, FILM COATED ORAL at 09:45

## 2022-11-14 RX ADMIN — NICOTINE POLACRILEX 2 MG: 2 GUM, CHEWING BUCCAL at 08:57

## 2022-11-14 RX ADMIN — OLANZAPINE 5 MG: 5 TABLET, FILM COATED ORAL at 21:31

## 2022-11-14 NOTE — PROGRESS NOTES
Diagnosis of Bipolar disorder reviewed  Short term goals for decrease in depressive symptoms, decrease in anxiety symptoms, decrease in suicidal thoughts, decrease in self abusive behaviors, ability to stay safe on the unit, ability to stay free of restraints, improvement in ability to express basic needs, improvement in insight, improvement in reasoning ability, improvement in self care, sleep improvement, improvement in appetite, mood stabilization, increase in group attendance, increase in socialization with peers on the unit, acceptance of need for psychiatric treatment, acceptance of psychiatric medications discussed  All present parties in agreement and treatment plan signed      11/15/22 7946   Team Meeting   Meeting Type Tx Team Meeting   Team Members Present   Team Members Present Physician;Nurse;   Physician Team Member Dr Tay Mills Team Member [address] Management Team Member Ezio   Patient/Family Present   Patient Present Yes   Patient's Family Present No
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
## 2022-11-14 NOTE — TREATMENT TEAM
11/14/22 1230   Activity/Group Checklist   Group Life Skills  (building paper tower with team)   Attendance Attended   Attendance Duration (min) 16-30  (took a couple breaks when needed)   Interactions Interacted appropriately   Affect/Mood Appropriate   Goals Achieved Able to listen to others; Able to engage in interactions; Other (Comment); Able to give feedback to another;Able to recieve feedback  (frustration tolerance, problem solving, teamwork and effective communication skills)

## 2022-11-14 NOTE — NURSING NOTE
Patient visible in the unit, comfortable with unit activities, social with peers  Denies SI  Attended evening group  Appropriately participated  Patient reported to this nurse that she had to leave the group activity and requested prn atarax, "I have severe anxiety "  This writer redirected patient with a walk to discuss concerns  "We are working on a group activity and they are not listening to me when I suggest something "  Discussed coping strategies  Patient was able to refocus and returned to group activity  No PRN required  Patient was able to engage with peers and complete group activity with no other concerns or restlessness  No other questions or concerns

## 2022-11-14 NOTE — NURSING NOTE
Pt bright and pleasant upon interaction  Reports combination of Zyprexa and Melatonin effective for sleeping all night  Pt reports feeling "good"  Denies SI/HI/AH/VH  Pt reports goal is to eat all meals and snacks  States "If I don't eat, I'll stay here longer"  Pt complaint with medication, social with peers, and attending groups

## 2022-11-14 NOTE — PROGRESS NOTES
Progress Note - [address] 23 y o  female MRN: [id number]  Unit/Bed#: [facility] 255-02 Encounter: [id number]    Assessment/Plan   Principal Problem:    Bipolar disorder ([address] )  Active Problems:    Anxiety    PTSD (post-traumatic stress disorder)    Medical clearance for psychiatric admission    Tobacco use      Subjective: Patient was seen, chart was reviewed, and case was discussed with the team  This is 23 female with hx of Bipolar disorder/anxity/PTSD admitted to inpatient unit for worsening of mood and suicidal ideations in the context of psychosocial stressors and non compliance with treatment  Zyprexa and Prozac were restarted and feels some improvement in mood  Denies any thoughts to hurt self or others  She is compliant with medications  Denies any side effects     Behavior over the last 24 hours:  improved  Sleep: normal  Appetite: normal  Medication side effects: No    Medical ROS: Pertinent items are noted in HPI all other systems are negative    Current Medications:  Current Facility-Administered Medications   Medication Dose Route Frequency   • acetaminophen (TYLENOL) tablet 650 mg  650 mg Oral Q6H PRN   • acetaminophen (TYLENOL) tablet 650 mg  650 mg Oral Q4H PRN   • acetaminophen (TYLENOL) tablet 975 mg  975 mg Oral Q6H PRN   • albuterol (PROVENTIL HFA,VENTOLIN HFA) inhaler 2 puff  2 puff Inhalation Q4H PRN   • aluminum-magnesium hydroxide-simethicone (MYLANTA) oral suspension 30 mL  30 mL Oral Q4H PRN   • haloperidol lactate (HALDOL) injection 2 5 mg  2 5 mg Intramuscular Q4H PRN Max 4/day    And   • LORazepam (ATIVAN) injection 1 mg  1 mg Intramuscular Q4H PRN Max 4/day    And   • benztropine (COGENTIN) injection 0 5 mg  0 5 mg Intramuscular Q4H PRN Max 4/day   • haloperidol lactate (HALDOL) injection 5 mg  5 mg Intramuscular Q4H PRN Max 4/day    And   • LORazepam (ATIVAN) injection 2 mg  2 mg Intramuscular Q4H PRN Max 4/day    And   • benztropine (COGENTIN) injection 1 mg  1 mg Intramuscular Q4H PRN Max 4/day   • benztropine (COGENTIN) tablet 1 mg  1 mg Oral Q4H PRN Max 6/day   • bisacodyl (DULCOLAX) rectal suppository 10 mg  10 mg Rectal Daily PRN   • hydrOXYzine HCL (ATARAX) tablet 50 mg  50 mg Oral Q6H PRN Max 4/day    Or   • diphenhydrAMINE (BENADRYL) injection 50 mg  50 mg Intramuscular Q6H PRN   • FLUoxetine (PROzac) capsule 20 mg  20 mg Oral Daily   • haloperidol (HALDOL) tablet 1 mg  1 mg Oral Q6H PRN   • haloperidol (HALDOL) tablet 2 5 mg  2 5 mg Oral Q4H PRN Max 4/day   • haloperidol (HALDOL) tablet 5 mg  5 mg Oral Q4H PRN Max 4/day   • hydrOXYzine HCL (ATARAX) tablet 100 mg  100 mg Oral Q6H PRN Max 4/day    Or   • LORazepam (ATIVAN) injection 2 mg  2 mg Intramuscular Q6H PRN   • hydrOXYzine HCL (ATARAX) tablet 25 mg  25 mg Oral Q6H PRN Max 4/day   • melatonin tablet 3 mg  3 mg Oral HS   • nicotine (NICODERM CQ) 21 mg/24 hr TD 24 hr patch 21 mg  21 mg Transdermal Daily   • nicotine polacrilex (NICORETTE) gum 2 mg  2 mg Oral Q2H PRN   • OLANZapine (ZyPREXA) tablet 5 mg  5 mg Oral HS   • polyethylene glycol (MIRALAX) packet 17 g  17 g Oral Daily PRN   • senna-docusate sodium (SENOKOT S) 8 6-50 mg per tablet 1 tablet  1 tablet Oral Daily PRN   • traZODone (DESYREL) tablet 50 mg  50 mg Oral HS PRN       Behavioral Health Medications:   all current active meds have been reviewed  Vitals:  Vitals:    11/14/22 0823   BP: 124/81   Pulse: 72   Resp:    Temp:    SpO2:        Laboratory results:    I have personally reviewed all pertinent laboratory/tests results      Mental Status Evaluation:    Appearance:  age appropriate   Behavior:  normal   Speech:  soft   Mood:  depressed   Affect:  constricted   Thought Process:  goal directed and logical   Thought Content:  normal   Perceptual Disturbances: None   Risk Potential: Suicidal Ideations none  Homicidal Ideations none  Potential for Aggression No   Sensorium:  person, place and time/date   Memory:  recent and remote memory grossly intact   Consciousness:  alert and awake    Attention: attention span appeared shorter than expected for age   Insight:  limited   Judgment: limited   Gait/Station: normal gait/station   Motor Activity: no abnormal movements       Progress Toward Goals: Progressing    Recommended Treatment: Continue with pharmacotherapy, group therapy, milieu therapy and occupational therapy  Risks, benefits and possible side effects of Medications:   Risks, benefits, alternatives, and possible side effects of patient's psychiatric medications were discussed with patient

## 2022-11-14 NOTE — TREATMENT TEAM
11/14/22 1420   Activity/Group Checklist   Group Admission/Discharge   Attendance Attended   Attendance Duration (min) 0-15   Interactions Interacted appropriately  (pt independently filled out the admission self assessment form   no questions once completed )   Affect/Mood Appropriate   Goals Achieved Able to engage in interactions

## 2022-11-14 NOTE — PLAN OF CARE
Problem: SELF HARM/SUICIDALITY  Goal: Will have no self-injury during hospital stay  Description: INTERVENTIONS:  - Q 15 MINUTES: Routine safety checks  - Q WAKING SHIFT & PRN: Assess risk to determine if routine checks are adequate to maintain patient safety  - Encourage patient to participate actively in care by formulating a plan to combat response to suicidal ideation, identify supports and resources  Outcome: Progressing     Problem: DEPRESSION  Goal: Will be euthymic at discharge  Description: INTERVENTIONS:  - Administer medication as ordered  - Provide emotional support via 1:1 interaction with staff  - Encourage involvement in milieu/groups/activities  - Monitor for social isolation  Outcome: Progressing     Problem: BEHAVIOR  Goal: Pt/Family maintain appropriate behavior and adhere to behavioral management agreement, if implemented  Description: INTERVENTIONS:  - Assess the family dynamic   - Encourage verbalization of thoughts and concerns in a socially appropriate manner  - Assess patient/family's coping skills and non-compliant behavior (including use of illegal substances)  - Utilize positive, consistent limit setting strategies supporting safety of patient, staff and others  - Initiate consult with Case Management, Spiritual Care or other ancillary services as appropriate  - If a patient's/visitor's behavior jeopardizes the safety of the patient, staff, or others, refer to organization procedure     - Notify Security of behavior or suspected illegal substances which indicate the need for search of the patient and/or belongings  - Encourage participation in the decision making process about a behavioral management agreement; implement if patient meets criteria  Outcome: Progressing     Problem: ANXIETY  Goal: Will report anxiety at manageable levels  Description: INTERVENTIONS:  - Administer medication as ordered  - Teach and encourage coping skills  - Provide emotional support  - Assess patient/family for anxiety and ability to cope  Outcome: Progressing  Goal: By discharge: Patient will verbalize 2 strategies to deal with anxiety  Description: Interventions:  - Identify any obvious source/trigger to anxiety  - Staff will assist patient in applying identified coping technique/skills  - Encourage attendance of scheduled groups and activities  Outcome: Progressing     Problem: SLEEP DISTURBANCE  Goal: Will exhibit normal sleeping pattern  Description: Interventions:  -  Assess the patients sleep pattern, noting recent changes  - Administer medication as ordered  - Decrease environmental stimuli, including noise, as appropriate during the night  - Encourage the patient to actively participate in unit groups and or exercise during the day to enhance ability to achieve adequate sleep at night  - Assess the patient, in the morning, encouraging a description of sleep experience  Outcome: Progressing     Problem: SELF CARE DEFICIT  Goal: Return ADL status to a safe level of function  Description: INTERVENTIONS:  - Administer medication as ordered  - Assess ADL deficits and provide assistive devices as needed  - Obtain PT/OT consults as needed  - Assist and instruct patient to increase activity and self care as tolerated  Outcome: Progressing

## 2022-11-14 NOTE — CASE MANAGEMENT
INTAKE    Readmit score:  Red 25   Confirmed Address   [address]     Resides in the home with/can return?:    Pt was staying with a friend for about 3 days after leaving her adoptive parents home and cannot return to friends home  Pt also reported "my parents pretty much told me that if I move out of their house, I am not welcomed back " I cannot go back there  Pt reported she has been trying to call other friends to see if she can go there but has not gotten through to anyone  Confirmed Phone Number: [phone]   Commitment Status/Admitted from: [address] [place] [address] ED    Presenting C/O:             ED Note   "  Suicidal        Pt arrives by EMS with c/o suicidal thoughts of cutting herself  Pt states she has been suicidal for months but does not currently have any thoughts of harming herself at this time        Patient is a 51-year-old female, past medical history of anxiety and depression, who presents to the emergency department for suicidal ideation  Patient states she has had passive suicide ideation for awhile  She does not have a specific plan  She denies any homicidal ideation  She denies any auditory or visual hallucinations  She states that today she decided to come in because she was kicked out of her friend's house recently and she no longer has a place to live  She has no other complaints at this time "     Psychiatrist:    Pt reported that after dc from [facility] in 9/22 she did not see a Psychiatrist       Therapist:    Ron Traore - pt reported she had been going there after dc from [facility] but has not been in about 1 month      ACT/ICM/CPS/WRT/SC: N/A   PCP:    Tam Irwin MD  [phone]   Work/Income:      Pt does not work and does not get SSI   Legal/  Probation/[place] Ofc:    Denies   Access to Firearms:    Denies   Referrals Needed: [facility], [facility]? Transport at Discharge:    Pt will need transportation    IMM:   [employer] Text STEPHANIE: N/A   Emergency Contact:     Maxine Herrera (Mother) [phone]   Mena Johnsonbo (Dad) [phone]  (pt requested that her adoptive parents be removed from Emergency contact and to add her friend Mert Aden - [phone])    ROIs obtained:       [facility] referral    Insurance:     Junior Mary CHAUDHRY    (pt reported that she still believes she has active [place] under her dad Kelly Veronique and she does not think any changes were made recently)    Audit:        PAWSS:  BAT:  UDS: + THC

## 2022-11-14 NOTE — ED NOTES
Insurance Authorization for admission:   Phone call placed to [employer]  Phone number: [phone]  Spoke to Dilma      ** days approved  Level of care: Inpatient  Review on **  Authorization # **    Pending auth# [id number]    [address] [place] [address] stated someone will call back to provide the auth info

## 2022-11-14 NOTE — NURSING NOTE
Pt visible in the milieu, social with peers, and attending groups  Denies SI/HI/AH/VH  Denies any question or concern at this time

## 2022-11-14 NOTE — TREATMENT TEAM
11/14/22 1000   Activity/Group Checklist   Group Community meeting   Attendance Attended   Attendance Duration (min) 16-30   Interactions Interacted appropriately   Affect/Mood Appropriate   Goals Achieved Able to engage in interactions; Able to listen to others; Other (Comment)  (identified goals for the day)

## 2022-11-14 NOTE — TREATMENT TEAM
11/14/22 5280   Activity/Group Checklist   Group Other (Comment)  (art therapy)   Attendance Attended   Attendance Duration (min) 46-60   Interactions Interacted appropriately   Affect/Mood Appropriate   Goals Achieved Able to listen to others; Able to engage in interactions; Other (Comment)  (authentic, spontaneous self expression, connection, and insight)

## 2022-11-15 RX ORDER — PRAZOSIN HYDROCHLORIDE 1 MG/1
1 CAPSULE ORAL DAILY
Status: DISCONTINUED | OUTPATIENT
Start: 2022-11-15 | End: 2022-11-16

## 2022-11-15 RX ADMIN — FLUOXETINE 20 MG: 20 CAPSULE ORAL at 09:18

## 2022-11-15 RX ADMIN — NICOTINE POLACRILEX 2 MG: 2 GUM, CHEWING BUCCAL at 11:01

## 2022-11-15 RX ADMIN — Medication 21 MG: at 09:19

## 2022-11-15 RX ADMIN — Medication 3 MG: at 22:02

## 2022-11-15 RX ADMIN — OLANZAPINE 5 MG: 5 TABLET, FILM COATED ORAL at 22:02

## 2022-11-15 RX ADMIN — PRAZOSIN HYDROCHLORIDE 1 MG: 1 CAPSULE ORAL at 11:02

## 2022-11-15 NOTE — NURSING NOTE
Pt is pleasant and cooperative on interaction  OOB with encouragement for breakfast and social with peers in tv room  Reports sleeping well overnight  Denies SI  Denies feeling anxious or depressed    Pt is unsure where she will discharge to, states "I tried to make phone calls but no one answered "

## 2022-11-15 NOTE — TREATMENT TEAM
11/15/22 1000   Activity/Group Checklist   Group Community meeting   Attendance Attended   Attendance Duration (min) 16-30   Interactions Interacted appropriately   Affect/Mood Appropriate   Goals Achieved Able to engage in interactions; Able to listen to others; Other (Comment)  (identified goals for the day)

## 2022-11-15 NOTE — PROGRESS NOTES
Progress Note - [address] 23 y o  female MRN: [id number]   Unit/Bed#: [facility] 255-02 Encounter: [id number]    Behavior over the last 24 hours: slowly improving  Maico Mckeon  Is a 27-year-old female with history of bipolar disorder and suicidal ideation presenting to inpatient psychiatry  She states she has cut herself previously on the arms and thighs, and explains that converting her emotional problems into physical problems is easier for her to process  She denies feelings of self harm and harm to others since being admitted to the unit, and expressly states that her suicidal ideations have resolved  She states she is feeling tired at today's visit due to early morning awakening and poor sleep overnight  She reports having traumatic nightmares, and expressed interest in starting Minipress  She is otherwise sleeping well, and has not had other days here with decreased energy  She expressed appreciation for her time on the unit, and has been enjoying group therapy sessions, arts and crafts, and socializing with other patients  Patient is tolerating medications  She denies medication side effects, visual hallucinations, auditory hallucinations, and homicidal ideation       Sleep: early awakening, nightmares  Appetite: normal  Medication side effects: No   ROS: all other systems are negative    Mental Status Evaluation:    Appearance:  age appropriate, casually dressed, adequate grooming   Behavior:  pleasant, cooperative, calm   Speech:  normal rate, normal volume   Mood:  less anxious, less depressed   Affect:  constricted   Thought Process:  organized, coherent, linear   Associations: intact associations   Thought Content:  normal, no overt delusions   Perceptual Disturbances: no auditory hallucinations, no visual hallucinations   Risk Potential: Suicidal ideation - None at present  Homicidal ideation - None  Potential for aggression - No   Sensorium:  oriented to person, place and time/date Memory:  recent and remote memory grossly intact   Consciousness:  alert and awake   Attention/Concentration: attention span and concentration are age appropriate   Insight:  fair   Judgment: fair   Gait/Station: normal gait/station   Motor Activity: no abnormal movements     Vital signs in last 24 hours:    Temp:  [97 5 °F (36 4 °C)-98 3 °F (36 8 °C)] 97 5 °F (36 4 °C)  HR:  [55-62] 55  Resp:  [17-18] 17  BP: (102)/(61) 102/61    Laboratory results: I have personally reviewed all pertinent laboratory/tests results    Results from the past 24 hours: No results found for this or any previous visit (from the past 24 hour(s))  Most Recent Labs:   Lab Results   Component Value Date    WBC 7 06 09/09/2022    RBC 4 26 09/09/2022    HGB 12 5 09/09/2022    HCT 36 3 09/09/2022     09/09/2022    RDW 13 2 09/09/2022    NEUTROABS 3 85 09/09/2022    SODIUM 139 09/09/2022    K 3 9 09/09/2022     09/09/2022    CO2 29 09/09/2022    BUN 13 09/09/2022    CREATININE 0 62 09/09/2022    GLUC 102 09/09/2022    CALCIUM 9 2 09/09/2022    AST 11 (L) 09/09/2022    ALT 7 09/09/2022    ALKPHOS 61 09/09/2022    TP 6 7 09/09/2022    ALB 4 4 09/09/2022    TBILI 0 35 09/09/2022    CHOLESTEROL 182 11/13/2022    HDL 52 11/13/2022    TRIG 48 11/13/2022    LDLCALC 120 (H) 11/13/2022    NONHDLC 130 11/13/2022    XSP2WNCZXLKA 3 057 08/31/2022    PREGUR negative 11/11/2022    PREGSERUM Negative 08/25/2022    RPR Non-Reactive 11/13/2022    HGBA1C 5 0 08/25/2022    EAG 97 08/25/2022       Progress Toward Goals: progressing    Assessment/Plan   Principal Problem:    Bipolar disorder ([facility] [address] )  Active Problems:    Anxiety    PTSD (post-traumatic stress disorder)    Medical clearance for psychiatric admission    Tobacco use      Recommended Treatment:     Planned medication and treatment changes:     All current active medications have been reviewed  Encourage group therapy, milieu therapy and occupational therapy  Behavioral Health checks every 7 minutes  Continue current medication  Add Minipress 1 mg PO QHS for nightmares  Discussed criteria for borderline personality disorder, as patient has exhibited traits including unstable interpersonal relationships and emotions, chronic feelings of emptiness, impulsivity, history of self-harming via wrist cutting, among others  Discussed with patient possibility of outpatient DBT in the future      Current Facility-Administered Medications   Medication Dose Route Frequency Provider Last Rate   • acetaminophen  650 mg Oral Q6H PRN Ray Dorsey MD     • acetaminophen  650 mg Oral Q4H PRN Ray Dorsey MD     • acetaminophen  975 mg Oral Q6H PRN Ray Dorsey MD     • albuterol  2 puff Inhalation Q4H PRN Gordo Snowden PA-C     • aluminum-magnesium hydroxide-simethicone  30 mL Oral Q4H PRN Ray Dorsey MD     • haloperidol lactate  2 5 mg Intramuscular Q4H PRN Max 4/day Ray Dorsey MD      And   • LORazepam  1 mg Intramuscular Q4H PRN Max 4/day Ray Dorsey MD      And   • benztropine  0 5 mg Intramuscular Q4H PRN Max 4/day Ray Dorsey MD     • haloperidol lactate  5 mg Intramuscular Q4H PRN Max 4/day Ray Dorsey MD      And   • LORazepam  2 mg Intramuscular Q4H PRN Max 4/day Ray Dorsey MD      And   • benztropine  1 mg Intramuscular Q4H PRN Max 4/day Ray Dorsey MD     • benztropine  1 mg Oral Q4H PRN Max 6/day Ray Dorsey MD     • bisacodyl  10 mg Rectal Daily PRN Ray Dorsey MD     • hydrOXYzine HCL  50 mg Oral Q6H PRN Max 4/day Ray Dorsey MD      Or   • diphenhydrAMINE  50 mg Intramuscular Q6H PRN Ray Dorsey MD     • FLUoxetine  20 mg Oral Daily Idalmis Hyde MD     • haloperidol  1 mg Oral Q6H PRN Ray Dorsey MD     • haloperidol  2 5 mg Oral Q4H PRN Max 4/day Ray Dorsey MD     • haloperidol  5 mg Oral Q4H PRN Max 4/day Ray Dorsey MD     • hydrOXYzine HCL  100 mg Oral Q6H PRN Max 4/day Anna MCCLOUD Amanda Culver MD      Or   • LORazepam  2 mg Intramuscular Q6H PRN Edie Fuentes MD     • hydrOXYzine HCL  25 mg Oral Q6H PRN Max 4/day Edie Fuentes MD     • melatonin  3 mg Oral HS Edie Fuentes MD     • nicotine  21 mg Transdermal Daily Edie Fuentes MD     • nicotine polacrilex  2 mg Oral Q2H PRN Edie Fuentes MD     • OLANZapine  5 mg Oral HS Edie Fuentes MD     • polyethylene glycol  17 g Oral Daily PRN Edie Fuentes MD     • prazosin  1 mg Oral Daily Noam Martinez PA-C     • senna-docusate sodium  1 tablet Oral Daily PRN Edie Fuentes MD     • traZODone  50 mg Oral HS PRN Edie Fuentes MD       Risks / Benefits of Treatment:    Risks, benefits, and possible side effects of medications explained to patient and patient verbalizes understanding and agreement for treatment  Counseling / Coordination of Care:    Patient's progress discussed with staff in treatment team meeting  Medications, treatment progress and treatment plan reviewed with patient      Noam Martinez PA-C 11/15/22

## 2022-11-15 NOTE — TREATMENT TEAM
11/15/22 1330   Activity/Group Checklist   Group Other (Comment)  (art therapy)   Attendance Attended   Attendance Duration (min) 31-45   Interactions Interacted appropriately   Affect/Mood Appropriate   Goals Achieved Able to engage in interactions; Able to listen to others; Other (Comment)  (authentic, spontaneous self expression, connection, and insight)

## 2022-11-15 NOTE — PROGRESS NOTES
Pt attention seeking at times, close with select peers  Denies SI/HI  DC: TBD      11/15/22 0759   Team Meeting   Meeting Type Daily Rounds   Team Members Present   Team Members Present Physician;Nurse;; Other (Discipline and Name)   Physician Team Member Dr Jared Sauer / Deyanira Poole / Delonte Cartwright Team Member Edwina Avery / Gallo Bhagat Management Team Member Alejandro Fulton / Christian Lion   Other (Discipline and Name) Tyrone Zarate - Art Therapy   Patient/Family Present   Patient Present No   Patient's Family Present No

## 2022-11-16 RX ORDER — PRAZOSIN HYDROCHLORIDE 1 MG/1
1 CAPSULE ORAL
Status: DISCONTINUED | OUTPATIENT
Start: 2022-11-16 | End: 2022-11-21 | Stop reason: HOSPADM

## 2022-11-16 RX ADMIN — NICOTINE POLACRILEX 2 MG: 2 GUM, CHEWING BUCCAL at 09:31

## 2022-11-16 RX ADMIN — Medication 3 MG: at 21:21

## 2022-11-16 RX ADMIN — OLANZAPINE 5 MG: 5 TABLET, FILM COATED ORAL at 21:21

## 2022-11-16 RX ADMIN — PRAZOSIN HYDROCHLORIDE 1 MG: 1 CAPSULE ORAL at 21:21

## 2022-11-16 RX ADMIN — FLUOXETINE 20 MG: 20 CAPSULE ORAL at 08:54

## 2022-11-16 NOTE — PROGRESS NOTES
Progress Note - [address] 23 y o  female MRN: [id number]   Unit/Bed#: Jimbo 255-02 Encounter: [id number]    Behavior over the last 24 hours: slowly improving  Jeff Pettit  Is a 82-year-old female with history of bipolar disorder presenting to inpatient psychiatry  She states her sleep improved last night, and she did not experience any nightmares  She will begin taking Minipress in the evening tonight  She was pleasant and otherwise well  She did express difficulty concentrating this morning, and a history of distractibility, impulsiveness, and decreased need for sleep  She noted that the use of fidget toys helps her with her attention  Bipolar and penny symptoms were discussed with her, and she showed understanding of her diagnosis  Housing plans for future discharge were discussed, and she was interested in the [employer]  Does not endorse SI/HI, VH or AH      Sleep: improved  Appetite: normal  Medication side effects: No   ROS: all other systems are negative    Mental Status Evaluation:    Appearance:  age appropriate, casually dressed, dressed appropriately   Behavior:  normal, cooperative   Speech:  normal rate, normal volume   Mood:  less depressed   Affect:  normal range and intensity, appropriate, slightly brighter   Thought Process:  organized, linear   Associations: intact associations   Thought Content:  normal, no overt delusions   Perceptual Disturbances: no auditory hallucinations, no visual hallucinations   Risk Potential: Suicidal ideation - None at present  Homicidal ideation - None at present  Potential for aggression - No   Sensorium:  oriented to person, place and time/date   Memory:  recent and remote memory grossly intact   Consciousness:  alert and awake   Attention/Concentration: attention span and concentration appear shorter than expected for age   Insight:  fair and improving   Judgment: fair   Gait/Station: normal gait/station, normal balance   Motor Activity: no abnormal movements     Vital signs in last 24 hours:    Temp:  [97 9 °F (36 6 °C)-98 9 °F (37 2 °C)] 97 9 °F (36 6 °C)  HR:  [52-83] 52  Resp:  [18] 18  BP: ()/(47-90) 100/66    Laboratory results: I have personally reviewed all pertinent laboratory/tests results    Results from the past 24 hours: No results found for this or any previous visit (from the past 24 hour(s))  Most Recent Labs:   Lab Results   Component Value Date    WBC 7 06 09/09/2022    RBC 4 26 09/09/2022    HGB 12 5 09/09/2022    HCT 36 3 09/09/2022     09/09/2022    RDW 13 2 09/09/2022    NEUTROABS 3 85 09/09/2022    SODIUM 139 09/09/2022    K 3 9 09/09/2022     09/09/2022    CO2 29 09/09/2022    BUN 13 09/09/2022    CREATININE 0 62 09/09/2022    GLUC 102 09/09/2022    CALCIUM 9 2 09/09/2022    AST 11 (L) 09/09/2022    ALT 7 09/09/2022    ALKPHOS 61 09/09/2022    TP 6 7 09/09/2022    ALB 4 4 09/09/2022    TBILI 0 35 09/09/2022    CHOLESTEROL 182 11/13/2022    HDL 52 11/13/2022    TRIG 48 11/13/2022    LDLCALC 120 (H) 11/13/2022    NONHDLC 130 11/13/2022    UAX4QUGEPXOX 3 057 08/31/2022    PREGUR negative 11/11/2022    PREGSERUM Negative 08/25/2022    RPR Non-Reactive 11/13/2022    HGBA1C 5 0 08/25/2022    EAG 97 08/25/2022       Progress Toward Goals: continues to improve    Assessment/Plan   Principal Problem:    Bipolar disorder ([facility] [address] )  Active Problems:    Anxiety    PTSD (post-traumatic stress disorder)    Medical clearance for psychiatric admission    Tobacco use      Recommended Treatment:     Planned medication and treatment changes: All current active medications have been reviewed  Encourage group therapy, milieu therapy and occupational therapy  Behavioral Health checks every 7 minutes  Patient to continue medications, and move Minipress this evening before bed  Monitor for BP as patient had an episode of asymptomatic hypotensive BP yesterday afternoon following Minipress dosage   Patient to continue attending group therapy and milieu therapy       Current Facility-Administered Medications   Medication Dose Route Frequency Provider Last Rate   • acetaminophen  650 mg Oral Q6H PRN Catrachita Santillan MD     • acetaminophen  650 mg Oral Q4H PRN Catrachita Santillan MD     • acetaminophen  975 mg Oral Q6H PRN Catrachita Santillan MD     • albuterol  2 puff Inhalation Q4H PRN Jefferson Snowden PA-C     • aluminum-magnesium hydroxide-simethicone  30 mL Oral Q4H PRN Catrachita Santillan MD     • haloperidol lactate  2 5 mg Intramuscular Q4H PRN Max 4/day Catrachita Santillan MD      And   • LORazepam  1 mg Intramuscular Q4H PRN Max 4/day Catrachita Santillan MD      And   • benztropine  0 5 mg Intramuscular Q4H PRN Max 4/day Catrachita Santillan MD     • haloperidol lactate  5 mg Intramuscular Q4H PRN Max 4/day Catrachita Santillan MD      And   • LORazepam  2 mg Intramuscular Q4H PRN Max 4/day Catrachita Santillan MD      And   • benztropine  1 mg Intramuscular Q4H PRN Max 4/day Catrachita Santillan MD     • benztropine  1 mg Oral Q4H PRN Max 6/day Catrachita Santillan MD     • bisacodyl  10 mg Rectal Daily PRN Catrachita Santillan MD     • hydrOXYzine HCL  50 mg Oral Q6H PRN Max 4/day Catrachita Santillan MD      Or   • diphenhydrAMINE  50 mg Intramuscular Q6H PRN Catrachita Santillan MD     • FLUoxetine  20 mg Oral Daily Vanessa Restrepo MD     • haloperidol  1 mg Oral Q6H PRN Catrachita Santillan MD     • haloperidol  2 5 mg Oral Q4H PRN Max 4/day Catrachita Santillan MD     • haloperidol  5 mg Oral Q4H PRN Max 4/day Catrachita Santillan MD     • hydrOXYzine HCL  100 mg Oral Q6H PRN Max 4/day Catrachita Santillan MD      Or   • LORazepam  2 mg Intramuscular Q6H PRN Catrachita Santillan MD     • hydrOXYzine HCL  25 mg Oral Q6H PRN Max 4/day Catrachita Santillan MD     • melatonin  3 mg Oral HS Catrachita Santillan MD     • nicotine  21 mg Transdermal Daily Catrachita Santillan MD     • nicotine polacrilex  2 mg Oral Q2H PRN Catrachita Santillan MD     • OLANZapine  5 mg Oral HS Kiki Morataya MD     • polyethylene glycol  17 g Oral Daily PRN Kiki Morataya MD     • prazosin  1 mg Oral HS Angela Smith PA-C     • senna-docusate sodium  1 tablet Oral Daily PRN Kiki Morataya MD     • traZODone  50 mg Oral HS PRN Kiki Morataya MD       Risks / Benefits of Treatment:    Risks, benefits, and possible side effects of medications explained to patient and patient verbalizes understanding and agreement for treatment  Counseling / Coordination of Care:    Patient's progress discussed with staff in treatment team meeting  Medications, treatment progress and treatment plan reviewed with patient      Angela Smith PA-C 11/16/22

## 2022-11-16 NOTE — DISCHARGE INSTR - APPOINTMENTS
You will be discharged to [address] [address], [address] [place] [address]   You confirmed that your cell phone number is [phone]            Rahel Bradley or Antonella, ney Hui and Tito, will be calling you after your discharge, on the phone number that you provided  They will be available as an additional support, if needed  If you wish to speak with one of them, you may contact Gala Brenner at [phone] or Erica Boo at [phone]

## 2022-11-16 NOTE — NURSING NOTE
Pt received upsetting phone call from mother at beginning of shift  According to pt her mother "called to tell me that shes having a surgery; how could she? She knows im already depressed and pissed off, how could she do this to me?"  This writer validated pt feeling upset, but discussed that pt's mother was just trying to let her know about events going on outside of the hospital  Pt encouraged to take a minute for herself and then return to being social with peers  Pt then jumped out of bed, smiling, to go play cards with peers  Denies SI/HI/AVH  Endorses depression

## 2022-11-16 NOTE — PLAN OF CARE
Problem: SELF HARM/SUICIDALITY  Goal: Will have no self-injury during hospital stay  Description: INTERVENTIONS:  - Q 15 MINUTES: Routine safety checks  - Q WAKING SHIFT & PRN: Assess risk to determine if routine checks are adequate to maintain patient safety  - Encourage patient to participate actively in care by formulating a plan to combat response to suicidal ideation, identify supports and resources  Outcome: Progressing     Problem: DEPRESSION  Goal: Will be euthymic at discharge  Description: INTERVENTIONS:  - Administer medication as ordered  - Provide emotional support via 1:1 interaction with staff  - Encourage involvement in milieu/groups/activities  - Monitor for social isolation  Outcome: Progressing     Problem: BEHAVIOR  Goal: Pt/Family maintain appropriate behavior and adhere to behavioral management agreement, if implemented  Description: INTERVENTIONS:  - Assess the family dynamic   - Encourage verbalization of thoughts and concerns in a socially appropriate manner  - Assess patient/family's coping skills and non-compliant behavior (including use of illegal substances)  - Utilize positive, consistent limit setting strategies supporting safety of patient, staff and others  - Initiate consult with Case Management, Spiritual Care or other ancillary services as appropriate  - If a patient's/visitor's behavior jeopardizes the safety of the patient, staff, or others, refer to organization procedure     - Notify Security of behavior or suspected illegal substances which indicate the need for search of the patient and/or belongings  - Encourage participation in the decision making process about a behavioral management agreement; implement if patient meets criteria  Outcome: Progressing     Problem: ANXIETY  Goal: Will report anxiety at manageable levels  Description: INTERVENTIONS:  - Administer medication as ordered  - Teach and encourage coping skills  - Provide emotional support  - Assess patient/family for anxiety and ability to cope  Outcome: Progressing  Goal: By discharge: Patient will verbalize 2 strategies to deal with anxiety  Description: Interventions:  - Identify any obvious source/trigger to anxiety  - Staff will assist patient in applying identified coping technique/skills  - Encourage attendance of scheduled groups and activities  Outcome: Progressing     Problem: SLEEP DISTURBANCE  Goal: Will exhibit normal sleeping pattern  Description: Interventions:  -  Assess the patients sleep pattern, noting recent changes  - Administer medication as ordered  - Decrease environmental stimuli, including noise, as appropriate during the night  - Encourage the patient to actively participate in unit groups and or exercise during the day to enhance ability to achieve adequate sleep at night  - Assess the patient, in the morning, encouraging a description of sleep experience  Outcome: Progressing     Problem: SELF CARE DEFICIT  Goal: Return ADL status to a safe level of function  Description: INTERVENTIONS:  - Administer medication as ordered  - Assess ADL deficits and provide assistive devices as needed  - Obtain PT/OT consults as needed  - Assist and instruct patient to increase activity and self care as tolerated  Outcome: Progressing     Problem: SAFETY ADULT  Goal: Patient will remain free of falls  Description: INTERVENTIONS:  - Educate patient/family on patient safety including physical limitations  - Instruct patient to call for assistance with activity   - Consult OT/PT to assist with strengthening/mobility   - Keep Call bell within reach  - Keep bed low and locked with side rails adjusted as appropriate  - Keep care items and personal belongings within reach  - Initiate and maintain comfort rounds  - Make Fall Risk Sign visible to staff  - Apply yellow socks and bracelet for high fall risk patients  - Consider moving patient to room near nurses station  Outcome: Progressing

## 2022-11-16 NOTE — DISCHARGE INSTR - OTHER ORDERS
You have been discharged with a 1 month Supply of your Medications and will be assisted with those medications when you arrive to [address] [place] [address] [address]  [address]     SUICIDE AND CRISIS INTERVENTION LINE  Phone: [phone]  7 days a week/24 hours per day    [address] [facility] [place] DELEGATE HOTLINE  Phone: [phone]  7 days a week/24 hours per day    NEED HELP NOW!  [facility] non-Emergency Services  24/7 Hotline: [address] [place] [address] Avenue number: [phone]        Jacqueline is a confidential 7 days/week telephone support service manned by trained mental health consumers  Warm[facility] operates daily but is not able to accept calls between 2AM-6AM    [facility] provides support, a listening ear and can provide information about available services  [facility] specializes in the concerns of mental health consumers, their families and friends  However, we are also here for anyone who has a mental health concern, is confused about or just doesn't know anything about mental health or where to get information  To reach [place], call [phone] accepts calls between 6:00 AM to 10:00 AM and from 4:00 PM to 12:00 AM    Text CONNECT to [phone] from anywhere in the [place], anytime, about any type of crisis  A live, trained Crisis Counselor receives the text and lets you know that they are here to listen  The volunteer Crisis Counselor will help you move from a hot moment to a cool moment  [facility] ([facility]) AT [facility] - licensed telephone and mobile crisis services that provide mental health assessments to all age groups regardless of income or insurance  Crisis Intervention operates 24-hour/7 days a week  [address] [place] [address] assists consumer who are experiencing a mental health emergency and lack the resources to assist themselves  Immediate intervention for suicidal and depressed individuals with home visits/outreach being top priority   Crisis can be contacted at [phone]  The [facility] Mental Illness ([facility]) offers various education & support groups for you & your family  For more information visit their website at   [email or web address]   Dial 2-1-1 to get connected/get help  Free, confidential information & referral available 24/7: Aging Services, Child & Youth Services, Counseling, Education/Training, Food/Shelter/Clothing, Health Services, Parenting, Substance Abuse, Support Groups, Volunteer Opportunities, & much more  [email or web address], Web: [email or web address] [facility], Email: [address] [place], [address] [facility] - Phone: [phone], [phone]    Opens November 1, 2022 through April 30, 2023    Winter shelter for single men and single women  Registration 7:00pm to 9:00pm (Only employed guests with written proof of employment may enter station later)  First come, first served  Lights out at 10:30pm  Lights on at 5:45am     Meals will be served Monday through Friday for clients staying there only  Clients are required to wear a mask with the exception of eating and sleeping  Eligibility: Homeless single men and women 18 years and older who have no other shelter options; Unable to serve families  Must have no open criminal warrants or sexual offender status found on background check through [employer],  and [employer]  Hours: Monday through Sunday, 7:00pm to 7:00am   [phone]  Get Directions  Visit Website  Phone/FAX Numbers  [phone]  Intake Procedure: Walk-in during registration hours; No voucher needed   Intake Requirements: Completed intake and code of ethics form   Photo Identification required   Fee Amounts: None   Geographical Area: [facility], [address] [place] [address] [place]   Helpful Tips: Entrance is through the auxiliary gymnasium from [employer]  ([employer]) operates an emergency shelter which is open every night, November 15th to April 15th  [facility] policies and procedures have been created to be in accordance with city, county, state, and federal regulations  Shelter rules have been implemented for the safety of our guests, staff, volunteers, and visiting service providers  Guest Requirements  [facility] accepts adults, 18 years and older, who identify themselves as homeless  Adults must be able to navigate multiple flights of stairs and independently care for themselves due to the construction of the facility in the [address] is not able to provide shelter to minors under 25years of age  Individuals presenting at [facility] who are unable to meet the above criteria will be assisted by [facility] through contacting [facility] authorities for assistance  Shelter Guests are required to obtain a voucher from the [email or web address] located at [facility], [address] [facility]  Operations  Shelter doors open at 5 PM and will close after 8 PM  Edward Byes will not be allowed entrance AFTER 8 PM unless they have made prior arrangements with the [address] [facility] or have police escort  Guests are asked to not congregate outside the shelter before 4:45 PM  Guest admittance to the shelter is on the [facility] side of the building  Everyone entering the building is required to wear a mask and adhere to COVID-19 protocols  [facility] bed capacity is 70 beds per night  Beds may not be held or reserved in advance of a Guest's stay at shelter  Beds are assigned on a first arrived, first assigned basis  Beds will be held for Guests who stay at the shelter the following night until 8 PM, at that time the bed may be assigned to other individuals seeking shelter  Minda Kocher is provided in two shifts every night from 5:30 PM to 7:15 PM, with a break for disinfecting of the dining turcios between shifts  Showers are available if volunteers are present   Partner service providers are accessible at the shelter during the weeknights  List of visiting partner service providers will be posted on the Service Provider Calendar in the shelter  Guests are woken at 6 AM every day and are required to leave the premises by 7 AM  Guests are offered a bagged breakfast every day as they leave the facility

## 2022-11-16 NOTE — TREATMENT TEAM
11/16/22 1230   Activity/Group Checklist   Group Life Skills  (strategies for motivation dicussion)   Attendance Attended   Attendance Duration (min) 16-30   Interactions Interacted appropriately   Affect/Mood Appropriate   Goals Achieved Identified feelings; Able to listen to others; Able to reflect/comment on own behavior;Able to engage in interactions; Able to self-disclose; Able to recieve feedback

## 2022-11-16 NOTE — CASE MANAGEMENT
TRES called [facility] ([phone] option3) and left voicemail for Shayla Mayer asking for a call back with her fax number or email to send her pt referral for their [facility] program  TRES left contact phone number in voicemail for her to call back

## 2022-11-16 NOTE — NURSING NOTE
Pt is pleasant and bright during interaction  Reports sleeping well overnight  Denies SI/HI/AVH  Reports mood is "good"  Social with peers

## 2022-11-16 NOTE — PROGRESS NOTES
Pt denies SI/HI  Pleasant, social  Slept overnight  Anxious as she reported she does not have anywhere to go  DC: TBD    11/16/22 0758   Team Meeting   Meeting Type Daily Rounds   Team Members Present   Team Members Present Physician;Nurse;; Other (Discipline and Name)   Physician Team Member Dr Sebas Kennedy / Gracia Robb / Conchis Prescott Team Member [facility] Management Team Member Clarisa Garcia / Scott Cannon   Other (Discipline and Name) [place] - Art Therapy   Patient/Family Present   Patient Present No   Patient's Family Present No

## 2022-11-16 NOTE — CASE MANAGEMENT
CM met with pt to check in and discuss dc plan options  Pt reported that she called a few friends but did not get through to anyone that she would be able to stay with upon dc  CM asked pt if she spoke to her adoptive parents Gris Galvani and Bobbi Esquivel) since dc and pt reported that she has not and does not plan to speak to them  Pt reported "when I left their house, they basically told me to not come back "     Pt requested that they be removed as her Emergency contacts and requested to add friend Cayla Cheng - [phone])       CM also checked in with pt about her insurance and to let her know that  staff called [employer] to get authorization for her hospital stay but Dallas Tapia is saying that pt is active and covered under [employer]  Pt reported "I have [employer] " and it is through her adoptive dad Bobbi Esquivel  Pt reported that she did not know of any recent changes  Pt does not wish to speak to her adoptive parents at this time, nor does she agree to sign an STEPHANIE for CM to contact them  CM spoke to pt about different area shelters as dc options which pt verbalized understanding  CM also spoke to pt about [employer]  Pt is in agreement with that and was open to completing referral and signing STEPHANIE for CM to start the referral process  Pt reported she was open to either [place] or [place] location  CM and pt completed referral and CM will submit with clinical information  CM will continue to follow and support pt for dc planning as needed

## 2022-11-16 NOTE — NURSING NOTE
Pt attends all groups, compliant with medications, social with peers  Pleasant and cooperative  Denies all symptoms; no SI/HI/AVH, no anx/dep  Does not have any discharge plans

## 2022-11-17 RX ADMIN — OLANZAPINE 5 MG: 5 TABLET, FILM COATED ORAL at 21:43

## 2022-11-17 RX ADMIN — NICOTINE POLACRILEX 2 MG: 2 GUM, CHEWING BUCCAL at 10:02

## 2022-11-17 RX ADMIN — PRAZOSIN HYDROCHLORIDE 1 MG: 1 CAPSULE ORAL at 21:43

## 2022-11-17 RX ADMIN — Medication 21 MG: at 09:15

## 2022-11-17 RX ADMIN — FLUOXETINE 20 MG: 20 CAPSULE ORAL at 09:15

## 2022-11-17 RX ADMIN — NICOTINE POLACRILEX 2 MG: 2 GUM, CHEWING BUCCAL at 19:56

## 2022-11-17 RX ADMIN — Medication 3 MG: at 21:44

## 2022-11-17 NOTE — CASE MANAGEMENT
TRES received a voicemail from [address] [facility] [place]  for [employer] and he reported he was "reaching out with pertinent information on [place] and that I have found some family connections " Mary Kay Valerio asked for a call back at ([phone])     TRES will discuss with pt to see if she will sign a STEPHANIE to call Mary Kay Valerio back or if she wants to call him back with CM present

## 2022-11-17 NOTE — PROGRESS NOTES
Denies all symptoms, but worries about where she will dc to  DC: TBD - Referral to [facility] program submitted, waiting to hear back regarding referral     11/17/22 0851   Team Meeting   Meeting Type Daily Rounds   Team Members Present   Team Members Present Physician;Nurse;; Other (Discipline and Name)   Physician Team Member Dr Marin Lala / Eulogio Oconnor / Blanca Rodriguez Team Member [facility] Management Team Member Swetha Myers / Roverto Cohen   Other (Discipline and Name) [place] - Art Therapy   Patient/Family Present   Patient Present No   Patient's Family Present No

## 2022-11-17 NOTE — TREATMENT TEAM
11/17/22 1230   Activity/Group Checklist   Group Life Skills   Attendance Attended   Attendance Duration (min) 16-30   Interactions Interacted appropriately   Affect/Mood Appropriate   Goals Achieved Identified feelings; Able to listen to others; Able to engage in interactions; Able to give feedback to another;Able to recieve feedback

## 2022-11-17 NOTE — PROGRESS NOTES
Progress Note - [address] 23 y o  female MRN: [id number]  Unit/Bed#: [facility] 255-02 Encounter: [id number]      Song Tucker was seen for continuing care and reviewed with treatment team   Pt was out for group and pleasant on approach, reporting lesser depression (1/10) and less anxiety (1-2/10) since admission  She feels she has a better handle on her stressors and a plan to talk with staff on unit and after discharge, if she faces increasing stressors, she will seek out friends to talk to when she  Pt presently denies SI, self injurious thoughts/behaviors, HI, or psychotic Sxs  She feels ready for discharge tomorrow  Per EMR and floor team, Pt has been calm, cooperative, and medication compliant  Pt appears to be sleeping well  She is visible and social in the milieu, attending therapeutic groups with appropriate behaviors among peers  Case mgt is helping with housing--looking into [facility] at this time       Sleep:Good  Appetite: Good   Medication side effects: None per Pt    ROS: No complaints per Pt    Labs/Tests: Reviewed    Mental Status Evaluation:  Appearance:  Dressed, clean, good eye contact   Behavior:  Calm, cooperative, pleasant   Speech:  Clear, normal rate and volume   Mood:  Less anxious, Less depressed   Affect:  Appropriately broad   Thought Process:  Organized, Goal directed   Associations: Intact associations   Thought Content:  No verbalized delusions   Perceptual Disturbances: Pt denies any hallucinations or paranoia and does not appear to be responding to internal stimuli   Risk Potential: Pt presently denies SI or HI    Sensorium:  Self, birthday, Place, Day of the week, Month, Year   Memory:  short term memory grossly intact   Consciousness:  alert, awake   Attention: Good   Insight:  Seems at least fair   Judgment: Fair   Gait/Station: Normal gait/station   Motor Activity: No abnormal movements     Vitals:    11/16/22 0731 11/16/22 1440 11/16/22 2117 11/17/22 0747   BP: 100/66 135/75 130/93 (!) 87/55   BP Location: Right arm Left arm Right arm Left arm   Pulse: (!) 52 84 80 63   Resp: 18 16 18 16   Temp: 97 9 °F (36 6 °C) 98 1 °F (36 7 °C)  97 8 °F (36 6 °C)   TempSrc: Tympanic Tympanic  Tympanic   SpO2:    99%   Weight:       Height:           Admission on 11/12/2022   Component Date Value   • Vitamin B-12 11/13/2022 452    • Folate 11/13/2022 >20 0 (H)    • Vit D, 25-Hydroxy 11/13/2022 33 8    • RPR 11/13/2022 Non-Reactive    • Cholesterol 11/13/2022 182    • Triglycerides 11/13/2022 48    • HDL, Direct 11/13/2022 52    • LDL Calculated 11/13/2022 120 (H)    • Non-HDL-Chol (CHOL-HDL) 11/13/2022 130      Labwork:   Recent Results (from the past 160 hour(s))   FLU/RSV/COVID - if FLU/RSV clinically relevant    Collection Time: 11/11/22  6:21 PM    Specimen: Nose; Nares   Result Value Ref Range    SARS-CoV-2 Negative Negative    INFLUENZA A PCR Negative Negative    INFLUENZA B PCR Negative Negative    RSV PCR Negative Negative   POCT alcohol breath test    Collection Time: 11/11/22  6:30 PM   Result Value Ref Range    EXTBreath Alcohol 0 000    Rapid drug screen, urine    Collection Time: 11/11/22  8:17 PM   Result Value Ref Range    Amph/Meth UR Negative Negative    Barbiturate Ur Negative Negative    Benzodiazepine Urine Negative Negative    Cocaine Urine Negative Negative    Methadone Urine Negative Negative    Opiate Urine Negative Negative    PCP Ur Negative Negative    THC Urine Positive (A) Negative    Oxycodone Urine Negative Negative   POCT pregnancy, urine    Collection Time: 11/11/22  8:28 PM   Result Value Ref Range    EXT PREG TEST UR (Ref: Negative) negative     Control valid    Lipid panel    Collection Time: 11/13/22  6:39 AM   Result Value Ref Range    Cholesterol 182 See Comment mg/dL    Triglycerides 48 See Comment mg/dL    HDL, Direct 52 >=50 mg/dL    LDL Calculated 120 (H) 0 - 100 mg/dL    Non-HDL-Chol (CHOL-HDL) 130 mg/dl   Vitamin D 25 hydroxy    Collection Time: 11/13/22  6:40 AM   Result Value Ref Range    Vit D, 25-Hydroxy 33 8 30 0 - 100 0 ng/mL   RPR    Collection Time: 11/13/22  6:40 AM   Result Value Ref Range    RPR Non-Reactive Non-Reactive   Vitamin B12    Collection Time: 11/13/22  6:41 AM   Result Value Ref Range    Vitamin B-12 452 100 - 900 pg/mL   Folate    Collection Time: 11/13/22  6:41 AM   Result Value Ref Range    Folate >20 0 (H) 3 1 - 17 5 ng/mL       Progress Toward Goals:  Based on today's interview and review of prior notes, Pt has been improving  Continue the present regimen unchanged  Discharge planning for tomorrow if Pt remains stable   Get TSH     Assessment/Plan   Principal Problem:    Bipolar disorder ([address] )  Active Problems:    Anxiety    PTSD (post-traumatic stress disorder)    Medical clearance for psychiatric admission    Tobacco use      Recommended Treatment: Continue with pharmacotherapy, group therapy, milieu therapy and occupational therapy    The patient will be maintained on the following medications:  Current Facility-Administered Medications   Medication Dose Route Frequency Provider Last Rate   • acetaminophen  650 mg Oral Q6H PRN Vianey Rose MD     • acetaminophen  650 mg Oral Q4H PRN Vianey Rose MD     • acetaminophen  975 mg Oral Q6H PRN Vianey Rose MD     • albuterol  2 puff Inhalation Q4H PRN Zoran Snowden PA-C     • aluminum-magnesium hydroxide-simethicone  30 mL Oral Q4H PRN Vianey Rose MD     • haloperidol lactate  2 5 mg Intramuscular Q4H PRN Max 4/day Vianey Rose MD      And   • LORazepam  1 mg Intramuscular Q4H PRN Max 4/day Vianey Rose MD      And   • benztropine  0 5 mg Intramuscular Q4H PRN Max 4/day Vianey Rose MD     • haloperidol lactate  5 mg Intramuscular Q4H PRN Max 4/day Vianey Rose MD      And   • LORazepam  2 mg Intramuscular Q4H PRN Max 4/day Vianey Rose MD      And   • benztropine  1 mg Intramuscular Q4H PRN Max 4/day Kiki Morataya MD     • benztropine  1 mg Oral Q4H PRN Max 6/day Kiki Morataya MD     • bisacodyl  10 mg Rectal Daily PRN Kiki Morataya MD     • hydrOXYzine HCL  50 mg Oral Q6H PRN Max 4/day Kiki Morataya MD      Or   • diphenhydrAMINE  50 mg Intramuscular Q6H PRN Kiki Morataya MD     • FLUoxetine  20 mg Oral Daily Dieter Estrada MD     • haloperidol  1 mg Oral Q6H PRN Kiki Morataya MD     • haloperidol  2 5 mg Oral Q4H PRN Max 4/day Kiki Morataya MD     • haloperidol  5 mg Oral Q4H PRN Max 4/day Kiki Morataya MD     • hydrOXYzine HCL  100 mg Oral Q6H PRN Max 4/day Kiki Morataya MD      Or   • LORazepam  2 mg Intramuscular Q6H PRN Kiki Morataya MD     • hydrOXYzine HCL  25 mg Oral Q6H PRN Max 4/day Kiki Morataya MD     • melatonin  3 mg Oral HS Kiki Morataya MD     • nicotine  21 mg Transdermal Daily Kiki Morataya MD     • nicotine polacrilex  2 mg Oral Q2H PRN Kiki Morataya MD     • OLANZapine  5 mg Oral HS Kiki Morataya MD     • polyethylene glycol  17 g Oral Daily PRN Kiki Morataya MD     • prazosin  1 mg Oral HS Angela Smith PA-C     • senna-docusate sodium  1 tablet Oral Daily PRN Kiki Morataya MD     • traZODone  50 mg Oral HS PRN Kiki Morataya MD         Risks, benefits and possible side effects of Medications:   Risks, benefits, and possible side effects of medications explained to patient and patient verbalizes understanding and Risks of medications explained if female patient   Patient verbalizes understanding and agrees to notify her doctor if she becomes pregnant

## 2022-11-17 NOTE — CASE MANAGEMENT
CM sent completed referral and hospital documentation to Josh Brito at [address] ([email or web address]) for review

## 2022-11-17 NOTE — CASE MANAGEMENT
TRES met with pt and assisted her in calling Nae Hodgkins at [facility] ([phone] option 3) to complete phone interview for their [facility] program      Pt answered Angie's questions and call went well  aGto Talamantes asked if pt has a dc date at this time  CM informed her that pt has been cleared psychiatrically for dc and it would just depend on if pt is accepted to [facility] and when  Gato Talamantes reported she will call CM back to discuss

## 2022-11-17 NOTE — NURSING NOTE
Pt is calm and cooperative, pleasant during interaction  Visible on unit, observed social with laughing with peers  Denies SI/HI/AVH  Denies any concerns at this time  Reports currently waiting on referral to [facility]

## 2022-11-17 NOTE — CASE MANAGEMENT
TRES received an email back from [facility] ([email or web address]) and she would like to schedule an interview with pt today at 1pm      CM will assist pt with phone interview

## 2022-11-17 NOTE — TREATMENT TEAM
11/17/22 1330   Activity/Group Checklist   Group Other (Comment)  (art therapy)   Attendance Attended   Attendance Duration (min) 31-45   Interactions Interacted appropriately   Affect/Mood Appropriate   Goals Achieved Able to listen to others; Able to engage in interactions; Other (Comment)  (authentic, spontaneous self expression, connection, and insight)

## 2022-11-17 NOTE — CASE MANAGEMENT
CM met with pt again to inform her that CM received a call from Emeka Adler,  at [employer] and that he asked for a call back  CM let pt know that Emily Johansen reported "I would like to talk to you about her and that she does have family supports "     CM offered to call back with pt present, to have pt sign a STEPHANIE for CM to call or offered that pt could decline signing the STEPHANIE  Pt agreed to sign STEPHANIE for CM to call him directly without pt present  Pt also notified that pt's parents were downstairs asking to speak to someone regarding pt and her status  Pt continues to decline to sign STEPHANIE for anyone to speak to her parents  Pt was informed that parents were told that staff was unable to speak to them regarding pt/can't confirm or deny pt has been admitted as pt does not wish to sign a release  CM will call Emeka Adler back and will let pt know how conversation goes  CM will also provide her with an update on [facility]

## 2022-11-17 NOTE — CASE MANAGEMENT
TRES called Rubén Vernon,  ([phone]) and informed him that pt signed an STEPHANIE for CM to call back  TRES informed him that pt is safe and CM and pt are working on safe dc plan to [facility] program on Monday  Christin Fuller reported that he spoke to pt's parents earlier today and he reported "they did not say they are kicking her out and they do not want her to be homeless  They just wanted her to know that and that she can go back there if she wants to "     TRES will inform pt of this information and see how she wants to proceed  Christin Fuller was thankful for the call back  He reported that he will tell parents that pt is safe at this time

## 2022-11-18 PROBLEM — Z00.8 MEDICAL CLEARANCE FOR PSYCHIATRIC ADMISSION: Status: RESOLVED | Noted: 2022-11-13 | Resolved: 2022-11-18

## 2022-11-18 LAB — TSH SERPL DL<=0.05 MIU/L-ACNC: 1.02 UIU/ML (ref 0.45–4.5)

## 2022-11-18 RX ORDER — HYDROXYZINE HYDROCHLORIDE 25 MG/1
25 TABLET, FILM COATED ORAL EVERY 6 HOURS PRN
Qty: 20 TABLET | Refills: 0 | Status: SHIPPED | OUTPATIENT
Start: 2022-11-18

## 2022-11-18 RX ORDER — LANOLIN ALCOHOL/MO/W.PET/CERES
3 CREAM (GRAM) TOPICAL
Qty: 30 TABLET | Refills: 0 | Status: SHIPPED | OUTPATIENT
Start: 2022-11-18 | End: 2022-12-18

## 2022-11-18 RX ORDER — ALBUTEROL SULFATE 90 UG/1
2 AEROSOL, METERED RESPIRATORY (INHALATION) EVERY 4 HOURS PRN
Qty: 8 G | Refills: 0 | Status: SHIPPED | OUTPATIENT
Start: 2022-11-18

## 2022-11-18 RX ORDER — PRAZOSIN HYDROCHLORIDE 1 MG/1
1 CAPSULE ORAL
Qty: 30 CAPSULE | Refills: 0 | Status: SHIPPED | OUTPATIENT
Start: 2022-11-18 | End: 2022-12-18

## 2022-11-18 RX ORDER — OLANZAPINE 5 MG/1
5 TABLET ORAL
Qty: 30 TABLET | Refills: 0 | Status: SHIPPED | OUTPATIENT
Start: 2022-11-18 | End: 2022-12-18

## 2022-11-18 RX ORDER — FLUOXETINE HYDROCHLORIDE 20 MG/1
20 CAPSULE ORAL DAILY
Qty: 30 CAPSULE | Refills: 0 | Status: SHIPPED | OUTPATIENT
Start: 2022-11-19 | End: 2022-12-19

## 2022-11-18 RX ADMIN — NICOTINE POLACRILEX 2 MG: 2 GUM, CHEWING BUCCAL at 09:58

## 2022-11-18 RX ADMIN — FLUOXETINE 20 MG: 20 CAPSULE ORAL at 09:18

## 2022-11-18 RX ADMIN — OLANZAPINE 5 MG: 5 TABLET, FILM COATED ORAL at 21:36

## 2022-11-18 RX ADMIN — PRAZOSIN HYDROCHLORIDE 1 MG: 1 CAPSULE ORAL at 21:36

## 2022-11-18 RX ADMIN — NICOTINE POLACRILEX 2 MG: 2 GUM, CHEWING BUCCAL at 19:31

## 2022-11-18 RX ADMIN — Medication 21 MG: at 09:19

## 2022-11-18 RX ADMIN — Medication 3 MG: at 21:36

## 2022-11-18 NOTE — PROGRESS NOTES
Progress Note - [address] 23 y o  female MRN: [id number]   Unit/Bed#: [facility] 255-02 Encounter: [id number]    Behavior over the last 24 hours: improving  Love Chi is a 82-year-old female with a history of bipolar disorder and suspected borderline personality disorder who presents for psychiatric follow-up  Staff reports no behavioral issues overnight  She is pleasant, calm and cooperative upon approach  Tolerating medications without any issues  Feels her mood is improved, “I feel more calm, more stable  I am not manic ”  Feels distractibility has improved  She denies any SI or HI  She denies any AH or VH  PTSD related nightmares have resolved since beginning the [facility]  Her morning BP was a bit hypotensive yesterday, normotensive today and remains asymptomatic  She is hopeful to transition to the [facility] next week and appears motivated to continue care in the outpatient setting  Sleep: improved, slept better  Appetite: normal  Medication side effects: No   ROS: all other systems are negative    Mental Status Evaluation:    Appearance: casually dressed, appears consistent with stated age, normal grooming  Motor: no psychomotor disturbances, no gait abnormalities  Behavior:  Pleasant, calm, cooperative, interacts with this writer appropriately  Speech: normal rate, rhythm, and volume  Mood: "I feel better  I am not manic anymore"  Affect:  Less expansive, more appropriate, mood-congruent  Thought Process: organized, linear, and goal-oriented; intact associations  Thought Content: denies any delusional material, no preoccupation  Perception: denies any auditory or visual hallucinations, denies other perceptual disturbances  Risk Potential: denies suicidal ideation, plan, or intent   Denies homicidal ideation  Sensorium: Oriented to person, place, time, and situation  Cognition: cognitive ability appears intact but was not quantitatively tested  Consciousness: alert and awake  Attention/Concentration: able to focus without difficulty, attention and concentration are improved  Insight: improved  Judgement: improved    Vital signs in last 24 hours:    Temp:  [97 °F (36 1 °C)-98 4 °F (36 9 °C)] 97 °F (36 1 °C)  HR:  [77-84] 77  Resp:  [16-17] 16  BP: (105-121)/(69-75) 105/70    Laboratory results: I have personally reviewed all pertinent laboratory/tests results    Results from the past 24 hours:   Recent Results (from the past 24 hour(s))   TSH, 3rd generation with Free T4 reflex    Collection Time: 11/18/22  9:35 AM   Result Value Ref Range    TSH 3RD GENERATON 1 023 0 450 - 4 500 uIU/mL     Most Recent Labs:   Lab Results   Component Value Date    WBC 7 06 09/09/2022    RBC 4 26 09/09/2022    HGB 12 5 09/09/2022    HCT 36 3 09/09/2022     09/09/2022    RDW 13 2 09/09/2022    NEUTROABS 3 85 09/09/2022    SODIUM 139 09/09/2022    K 3 9 09/09/2022     09/09/2022    CO2 29 09/09/2022    BUN 13 09/09/2022    CREATININE 0 62 09/09/2022    GLUC 102 09/09/2022    CALCIUM 9 2 09/09/2022    AST 11 (L) 09/09/2022    ALT 7 09/09/2022    ALKPHOS 61 09/09/2022    TP 6 7 09/09/2022    ALB 4 4 09/09/2022    TBILI 0 35 09/09/2022    CHOLESTEROL 182 11/13/2022    HDL 52 11/13/2022    TRIG 48 11/13/2022    LDLCALC 120 (H) 11/13/2022    NONHDLC 130 11/13/2022    KDS6LTNSJQAE 1 023 11/18/2022    PREGUR negative 11/11/2022    PREGSERUM Negative 08/25/2022    RPR Non-Reactive 11/13/2022    HGBA1C 5 0 08/25/2022    EAG 97 08/25/2022       Progress Toward Goals: progressing, working on coping skills, discharge planning    Assessment/Plan   Principal Problem:    Bipolar disorder ([facility] [address] )  Active Problems:    Anxiety    PTSD (post-traumatic stress disorder)    Medical clearance for psychiatric admission    Tobacco use      Recommended Treatment:     Planned medication and treatment changes:     All current active medications have been reviewed  Encourage group therapy, milieu therapy and occupational therapy  Behavioral Health checks every 7 minutes    Continue current medications    On track for discharge Monday morning with transition to the [facility]     Current Facility-Administered Medications   Medication Dose Route Frequency Provider Last Rate   • acetaminophen  650 mg Oral Q6H PRN Roby East MD     • acetaminophen  650 mg Oral Q4H PRN Roby East MD     • acetaminophen  975 mg Oral Q6H PRN Roby East MD     • albuterol  2 puff Inhalation Q4H PRN Desiree Snowden PA-C     • aluminum-magnesium hydroxide-simethicone  30 mL Oral Q4H PRN Roby East MD     • haloperidol lactate  2 5 mg Intramuscular Q4H PRN Max 4/day Roby East MD      And   • LORazepam  1 mg Intramuscular Q4H PRN Max 4/day Roby East MD      And   • benztropine  0 5 mg Intramuscular Q4H PRN Max 4/day Roby East MD     • haloperidol lactate  5 mg Intramuscular Q4H PRN Max 4/day Roby East MD      And   • LORazepam  2 mg Intramuscular Q4H PRN Max 4/day Roby East MD      And   • benztropine  1 mg Intramuscular Q4H PRN Max 4/day Roby East MD     • benztropine  1 mg Oral Q4H PRN Max 6/day Roby East MD     • bisacodyl  10 mg Rectal Daily PRN Roby East MD     • hydrOXYzine HCL  50 mg Oral Q6H PRN Max 4/day Roby East MD      Or   • diphenhydrAMINE  50 mg Intramuscular Q6H PRN Roby East MD     • FLUoxetine  20 mg Oral Daily Mauro Shen MD     • haloperidol  1 mg Oral Q6H PRN Roby East MD     • haloperidol  2 5 mg Oral Q4H PRN Max 4/day Roby East MD     • haloperidol  5 mg Oral Q4H PRN Max 4/day Roby East MD     • hydrOXYzine HCL  100 mg Oral Q6H PRN Max 4/day Roby East MD      Or   • LORazepam  2 mg Intramuscular Q6H PRN Roby East MD     • hydrOXYzine HCL  25 mg Oral Q6H PRN Max 4/day Roby East MD     • melatonin  3 mg Oral HS Roby East MD     • nicotine  21 mg Transdermal Daily Kiki Morataya MD     • nicotine polacrilex  2 mg Oral Q2H PRN Kiki Morataya MD     • OLANZapine  5 mg Oral HS Kiki Morataya MD     • polyethylene glycol  17 g Oral Daily PRN Kiki Morataya MD     • prazosin  1 mg Oral HS Angela Smith PA-C     • senna-docusate sodium  1 tablet Oral Daily PRN Kiki Morataya MD     • traZODone  50 mg Oral HS PRN Kiki Morataya MD       Risks / Benefits of Treatment:    Risks, benefits, and possible side effects of medications explained to patient and patient verbalizes understanding and agreement for treatment  Counseling / Coordination of Care:    Patient's progress discussed with staff in treatment team meeting  Medications, treatment progress and treatment plan reviewed with patient      Angela Smith PA-C 11/18/22

## 2022-11-18 NOTE — CASE MANAGEMENT
CM received response from Catherine Thompson at [facility] ( [facility]) and she reported that the phone interview went really well with pt and pt is accepted to their program      Cleo Roper asked if pt could dc to them on Saturday 11/19/22, she then said "Monday would be ideal " CM informed her that there are not dc's on the weekends but Monday 11/21/22 would work great  CM confirmed that pt will need to bring 30 day supply of her medications  CM will work on that and will make sure pt has her medications filled at [facility]       CM will coordinate transportation for dc to [facility] at:  [address] [place] [address] [address], [place], [address] [place] [address]   Phone: [phone] [facility]

## 2022-11-18 NOTE — TREATMENT TEAM
11/18/22 1330   Activity/Group Checklist   Group Other (Comment)  (art therapy)   Attendance Attended   Attendance Duration (min) 16-30   Interactions Interacted appropriately   Affect/Mood Appropriate   Goals Achieved Able to listen to others; Able to engage in interactions; Other (Comment)  (authentic, spontaneous self expression, connection, and insight)

## 2022-11-18 NOTE — CASE MANAGEMENT
TRES met with pt again to inform her that she was accepted to [facility] and that she can dc there on Monday 11/21/22  Pt was smiling and very excited to hear that information  CM informed pt that she will have 30 day supply of her medications and then CM will arrange transport for Monday  Pt verbalized understanding and agreement  CM then informed pt that CM spoke to Cherylene Lace, SW at [facility]  CM informed pt that Giovanny Swan reported that he has been in contact with her parents and that he wanted to make sure pt knew that her parents care about her and that they reported that pt could come home and that they did not kick her out  Pt immediately laughed and said "they did tell me that If I leave, to never come back "     Pt reported "they need to know that yes, they're my parents but I am 23years old and they need to realize that "    Even though pt knew this information about her parents, pt was still adamant that she would like to go to [facility] and attend their program instead of returning to her parents  CM informed pt that she will be scheduled for dc for Monday 11/21/22 to [facility]  Pt agreed and verbalized agreement and understanding

## 2022-11-18 NOTE — CASE MANAGEMENT
CM faxed pt facesheet to [facility] (Fax# [phone]) for her meds to be filled prior to her dc to [facility] on Monday 11/21/22  CM called [facility] ([phone]) to confirm they received the facesheet and they did, CM to  medications

## 2022-11-18 NOTE — TREATMENT TEAM
11/18/22 1400   Activity/Group Checklist   Group Admission/Discharge   Attendance Attended   Attendance Duration (min) 0-15   Interactions Interacted appropriately  (pt independently filled out the relapse prevention plan form   omce copleted, there were no questions for this therapist )   Affect/Mood Appropriate   Goals Achieved Able to engage in interactions

## 2022-11-18 NOTE — BH TRANSITION RECORD
Contact Information: If you have any questions, concerns, pended studies, tests and/or procedures, or emergencies regarding your inpatient behavioral health visit  Please contact [address] [place] [address] behavioral health unit [phone] and ask to speak to a , nurse or physician  A contact is available 24 hours/ 7 days a week at this number  Summary of Procedures Performed During your Stay:  Below is a list of major procedures performed during your hospital stay and a summary of results:  - No major procedures performed  Pending Studies (From admission, onward)    None        Please follow up on the above pending studies with your PCP and/or referring provider

## 2022-11-18 NOTE — DISCHARGE SUMMARY
Discharge Summary - [address] 23 y o  female MRN: [id number]  Unit/Bed#: Arnaud [facility] 760-11 Encounter: [id number]     Admission Date: 11/12/2022         Discharge Date: 11/21/22    Attending Psychiatrist: Dr Marleny Bruner    Reason for Admission/HPI:     Per initial H&P from Dr Sharon Fowler on 11/13/22:    "Gerald Rodriguez is a 23 y o  female with a history of Bipolar Disorder, anxiety and PTSD who was admitted to the inpatient psychiatric unit on a voluntary 201 commitment basis due to suicidal ideation      Symptoms prior to admission included worsening depression, suicidal ideation, difficulty sleeping, mood swings, increased irritability, anxiety symptoms and poor self-care  Stressors preceding admission included family conflict, being homeless, limited support, social difficulties, everyday stressors and chronic anxiety  Records reviewed  Patient presented to the [facility] Emergency Department due to suicidal thoughts  She was seen by crisis worker and endorsed intermittent suicidal thoughts that had worsened after a fight with her parents  Patient endorsed that she was kicked out of the house  She had briefly stayed with a friend but was kicked out of that living situation as well  She was cooperative in emergency department and was agreeable to inpatient hospitalization  She signed a 201 in the emergency department  On arrival to the unit, she has remained calm, cooperative, and without any acute behavioral issues  Per review of chart, patient was admitted to [facility] earlier this year and was prescribed Prozac and Zyprexa      Candida Kathryn is pleasant, calm, cooperative throughout interview and answers all questions appropriately  She states that she has been struggling with suicidal thoughts intermittently that she attributes to mood instability and made worse by conflict with her adoptive parents    Patient states that she had historically had a good relationship with her adoptive mother, the in February began to have worsening conflicts with her and was given ultimatum during a recent argument that if she were to leave, she would not be allowed back in the house  Patient states she chose to leave and had stayed with friends, though after a few days her friends told her that she could not stay there any longer and did not provide any reason or warning for this  She endorses that she was having suicidal thoughts with plan to overdose on her Atarax  However, she had made the decision to come into the emergency department to seek help  She notes that she started having problems with her mood approximately 1 year ago at age 25  She states that since May she has had 4 inpatient hospitalizations including current hospitalization  Most recent was at [facility]  Per chart, this was in September, the patient states that this had been late October to early November of this year  She notes that she was diagnosed with bipolar disorder and started on Prozac and Zyprexa while at [facility] and was quickly titrated 40 milligrams of Prozac and 5 milligram Zyprexa  She did feel that she was somewhat irritable with the higher dose of Prozac, though has found Prozac to be generally helpful for her mood  She states she was continuing to take this on discharge, though has not had any outpatient treatment  She reports that she has had periods of time characterized by decreased need for sleep, sleeping only 2 hours per night, with increased irritability and mood lability, increased goal-directed activity, spending sprees  She states that she feels that more recently she has been in a more manic state with decreased sleep and increased irritability  She notes that her depressed episodes typically last for at most 1 week  She denies any auditory or visual hallucinations  She reports that she has had suicidal thoughts but denies any prior suicide attempts    She endorses nonsuicidal self-injury by scratching or cutting herself and states that this has been for emotional regulation  She notes having done this recently and that this has been an intermittent issue for some time  She currently is amenable to medication adjustment and would like to restart Prozac but agreeable to restarting at lower dose and monitoring at lower dose with possibly increasing Zyprexa if needing to increase Prozac as well  She reports feeling safe in the hospital   She denies any active SI, HI, or AVH while she is here  She denies any questions or concerns "      Social History     Tobacco History     Smoking Status  Former    Smokeless Tobacco Use  Never          Alcohol History     Alcohol Use Status  Not Currently Comment  denies -11/12/22          Drug Use     Drug Use Status  Not Currently Types  Marijuana Comment  quit 1 mos ago  - 11/12/22          Sexual Activity     Sexually Active  Yes Partners  Male, Female Birth Control/Protection  OCP          Activities of Daily Living    Not Asked               Additional Substance Use Detail     Questions Responses    Problems Due to Past Use of Alcohol? No    Problems Due to Past Use of Substances?  No    Substance Use Assessment Substance use within the past 12 months    Alcohol Use Frequency Experimented    Cannabis frequency Past occasional use    Comment:  Past occasional use on 11/12/2022     Heroin Frequency Denies use in past 12 months    Cannabis method Smoke    Comment:  Smoke on 11/12/2022     Cocaine frequency Never used    Comment:  Never used on 11/12/2022     Crack Cocaine Frequency Denies use in past 12 months    Methamphetamine Frequency Denies use in past 12 months    Narcotic Frequency Denies use in past 12 months    Benzodiazepine Frequency Denies use in past 12 months    Amphetamine frequency Denies use in past 12 months    Barbituate Frequency Denies use use in past 12 months    Inhalant frequency Never used    Comment:  Never used on 11/12/2022 Hallucinogen frequency Never used    Comment:  Never used on 2022     Ecstasy frequency Never used    Comment:  Never used on 2022     Other drug frequency Never used    Comment:  Never used on 2022     Opiate frequency Denies use in past 12 months    Last reviewed by Destini Murphy RN on 2022          Past Medical History:   Diagnosis Date   • Anxiety    • Asthma    • Bipolar disorder ([facility] [address] )    • Depression    • PTSD (post-traumatic stress disorder)      No past surgical history on file  Medications: All current active medications have been reviewed  Medications prior to admission:    Prior to Admission Medications   Prescriptions Last Dose Informant Patient Reported? Taking?    FLUoxetine (PROzac) 40 MG capsule   Yes No   Ferrous Gluconate 256 (28 Fe) MG TABS Not Taking at Unknown time  Yes No   Sig: Take 246 mg by mouth   Patient not taking: Reported on 2022   OLANZapine (ZyPREXA) 5 mg tablet   Yes Yes   Sig: Take 5 mg by mouth   albuterol (ProAir HFA) 90 mcg/act inhaler Past Month at Unknown time  No Yes   Sig: Inhale 2 puffs every 4 (four) hours as needed for wheezing   cholecalciferol (VITAMIN D3) 400 units tablet Not Taking at Unknown time  Yes No   Sig: Take 1 tablet by mouth daily   Patient not taking: Reported on 2022   hydrOXYzine HCL (ATARAX) 25 mg tablet   Yes No   Si-2 tabs po every 6 hrs prn for anxiety   nicotine (NICODERM CQ) 21 mg/24 hr TD 24 hr patch Past Week at Unknown time  No Yes   Sig: Place 1 patch on the skin every 24 hours   nicotine polacrilex (NICORETTE) 2 mg gum Past Week at Unknown time  No Yes   Sig: Chew 1 each (2 mg total) as needed for smoking cessation   norgestimate-ethinyl estradiol (ORTHO-CYCLEN) 0 25-35 MG-MCG per tablet   Yes No   Sig: Take 1 tablet by mouth daily   vitamin E 100 UNIT capsule Not Taking at Unknown time  Yes No   Sig: Take 100 Units by mouth daily   Patient not taking: Reported on 2022 Facility-Administered Medications: None       Allergies: Allergies   Allergen Reactions   • Kiwi Extract - Food Allergy Tongue Swelling   • Pollen Extract Cough       Objective     Vital signs in last 24 hours:    Temp:  [96 7 °F (35 9 °C)-99 5 °F (37 5 °C)] 96 7 °F (35 9 °C)  HR:  [74-89] 74  Resp:  [18] 18  BP: (107-139)/(63-84) 113/79    No intake or output data in the 24 hours ending 11/21/22 [address]:     Roverto Curiel was admitted to the inpatient psychiatric unit and started on Bgifty checks every 7 minutes  During the hospitalization she was encouraged to attend individual therapy, group therapy, milieu therapy and occupational therapy  Psychiatric medications were adjusted over the hospital stay  To address depressive symptoms, mood instability, manic symptoms, self-abusive behavior, anxiety symptoms, flashbacks, nightmares and insomnia, Roverto Curiel was treated with antidepressant Prozac, antipsychotic medication Zyprexa and medication to help with nightmares and flashbacks Prazosin  Medication doses were adjusted during the hospital course  Prozac was restarted at the dose of 20mg daily  Zyprexa was restarted at the dose of 5mg qhs  Minipress was added at the dose of 1mg qhs for nightmares and flashbacks with good success  Prior to beginning of treatment medications risks and benefits and possible side effects including risk of parkinsonian symptoms, Tardive Dyskinesia and metabolic syndrome related to treatment with antipsychotic medications and risk of suicidality and serotonin syndrome related to treatment with antidepressants were reviewed with Roverto Or  She verbalized understanding and agreement for treatment  Upon admission Roverto Curiel was seen by medical service for medical clearance for inpatient treatment and medical follow up  Polly's symptoms slowly improved over the hospital course  Initially after admission she was still feeling depressed, anxious, frustrated and overwhelmed  With adjustment of medications and therapeutic milieu her symptoms gradually improved  At the end of treatment Lester Tello was doing much better  Her mood was significantly improved at the time of discharge  Lester Tello denied suicidal ideation, intent or plan at the time of discharge and denied homicidal ideation, intent or plan at the time of discharge  There was no overt psychosis at the time of discharge  Lester Tello was participating appropriately in milieu at the time of discharge  Behavior was appropriate on the unit at the time of discharge  Sleep and appetite were improved  Lester Tello was tolerating medications and was not reporting any significant side effects at the time of discharge  We felt that at the end of the hospital stay Lester Tello was at baseline and was ready for discharge  Lester Tello also felt stable and ready for discharge at the end of the hospital stay  The outpatient follow up with [address] [place], [place] [address] was arranged by the unit  upon discharge  Mental Status at Time of Discharge:     Appearance: casually dressed, appears consistent with stated age, normal grooming  Motor: no psychomotor disturbances, no gait abnormalities  Behavior: calm, cooperative, interacts with this writer appropriately  Speech: normal rate, rhythm, and volume  Mood: "good"  Affect: appropriate, normal range and intensity, mood-congruent  Thought Process: organized, linear, and goal-oriented; intact associations  Thought Content: denies any delusional material, no preoccupation  Perception: denies any auditory or visual hallucinations, denies other perceptual disturbances  Risk Potential: denies suicidal ideation, plan, or intent   Denies homicidal ideation  Sensorium: Oriented to person, place, time, and situation  Cognition: cognitive ability appears intact but was not quantitatively tested  Consciousness: alert and awake  Attention/Concentration: able to focus without difficulty, attention and concentration are age appropriate  Insight: improved  Judgement: improved    Admission Diagnosis:    Principal Problem:    Bipolar disorder ([facility] )  Active Problems:    Anxiety    PTSD (post-traumatic stress disorder)    Tobacco use      Discharge Diagnosis:     Principal Problem:    Bipolar disorder ([facility] )  Active Problems:    Anxiety    PTSD (post-traumatic stress disorder)    Tobacco use  Resolved Problems:    Medical clearance for psychiatric admission      Lab Results:   I have personally reviewed all pertinent laboratory/tests results  Most Recent Labs:   Lab Results   Component Value Date    WBC 7 06 09/09/2022    RBC 4 26 09/09/2022    HGB 12 5 09/09/2022    HCT 36 3 09/09/2022     09/09/2022    RDW 13 2 09/09/2022    NEUTROABS 3 85 09/09/2022    SODIUM 139 09/09/2022    K 3 9 09/09/2022     09/09/2022    CO2 29 09/09/2022    BUN 13 09/09/2022    CREATININE 0 62 09/09/2022    GLUC 102 09/09/2022    CALCIUM 9 2 09/09/2022    AST 11 (L) 09/09/2022    ALT 7 09/09/2022    ALKPHOS 61 09/09/2022    TP 6 7 09/09/2022    ALB 4 4 09/09/2022    TBILI 0 35 09/09/2022    CHOLESTEROL 182 11/13/2022    HDL 52 11/13/2022    TRIG 48 11/13/2022    LDLCALC 120 (H) 11/13/2022    NONHDLC 130 11/13/2022    HWD5OWNJBAGX 1 023 11/18/2022    PREGUR negative 11/11/2022    PREGSERUM Negative 08/25/2022    RPR Non-Reactive 11/13/2022    HGBA1C 5 0 08/25/2022    EAG 97 08/25/2022       Discharge Medications:    See after visit summary for all reconciled discharge medications provided to patient and family  Discharge instructions/Information to patient and family:     See after visit summary for information provided to patient and family  Provisions for Follow-Up Care:    See after visit summary for information related to follow-up care and any pertinent home health orders  Discharge Statement:    I spent 33 minutes discharging the patient  This time was spent on the day of discharge   I had direct contact with the patient on the day of discharge  Additional documentation is required if more than 30 minutes were spent on discharge:    I reviewed with Stefanie Mittal importance of compliance with medications and outpatient treatment after discharge  I discussed the medication regimen and possible side effects of the medications with Stefanie Mittal prior to discharge  At the time of discharge she was tolerating psychiatric medications  I discussed outpatient follow up with Stefanie Mittal  I reviewed with Stefanie Mittal crisis plan and safety plan upon discharge  Stefanie Mittal was competent to understand risks and benefits of withholding information and risks and benefits of her actions      Discharge on Two Antipsychotic Medications: Tennille Bautista PA-C 11/21/22

## 2022-11-18 NOTE — PLAN OF CARE
Problem: SELF HARM/SUICIDALITY  Goal: Will have no self-injury during hospital stay  Description: INTERVENTIONS:  - Q 15 MINUTES: Routine safety checks  - Q WAKING SHIFT & PRN: Assess risk to determine if routine checks are adequate to maintain patient safety  - Encourage patient to participate actively in care by formulating a plan to combat response to suicidal ideation, identify supports and resources  Outcome: Progressing     Problem: DEPRESSION  Goal: Will be euthymic at discharge  Description: INTERVENTIONS:  - Administer medication as ordered  - Provide emotional support via 1:1 interaction with staff  - Encourage involvement in milieu/groups/activities  - Monitor for social isolation  Outcome: Progressing     Problem: BEHAVIOR  Goal: Pt/Family maintain appropriate behavior and adhere to behavioral management agreement, if implemented  Description: INTERVENTIONS:  - Assess the family dynamic   - Encourage verbalization of thoughts and concerns in a socially appropriate manner  - Assess patient/family's coping skills and non-compliant behavior (including use of illegal substances)  - Utilize positive, consistent limit setting strategies supporting safety of patient, staff and others  - Initiate consult with Case Management, Spiritual Care or other ancillary services as appropriate  - If a patient's/visitor's behavior jeopardizes the safety of the patient, staff, or others, refer to organization procedure     - Notify Security of behavior or suspected illegal substances which indicate the need for search of the patient and/or belongings  - Encourage participation in the decision making process about a behavioral management agreement; implement if patient meets criteria  Outcome: Progressing     Problem: ANXIETY  Goal: Will report anxiety at manageable levels  Description: INTERVENTIONS:  - Administer medication as ordered  - Teach and encourage coping skills  - Provide emotional support  - Assess patient/family for anxiety and ability to cope  Outcome: Progressing  Goal: By discharge: Patient will verbalize 2 strategies to deal with anxiety  Description: Interventions:  - Identify any obvious source/trigger to anxiety  - Staff will assist patient in applying identified coping technique/skills  - Encourage attendance of scheduled groups and activities  Outcome: Progressing     Problem: SLEEP DISTURBANCE  Goal: Will exhibit normal sleeping pattern  Description: Interventions:  -  Assess the patients sleep pattern, noting recent changes  - Administer medication as ordered  - Decrease environmental stimuli, including noise, as appropriate during the night  - Encourage the patient to actively participate in unit groups and or exercise during the day to enhance ability to achieve adequate sleep at night  - Assess the patient, in the morning, encouraging a description of sleep experience  Outcome: Progressing     Problem: SELF CARE DEFICIT  Goal: Return ADL status to a safe level of function  Description: INTERVENTIONS:  - Administer medication as ordered  - Assess ADL deficits and provide assistive devices as needed  - Obtain PT/OT consults as needed  - Assist and instruct patient to increase activity and self care as tolerated  Outcome: Progressing     Problem: SAFETY ADULT  Goal: Patient will remain free of falls  Description: INTERVENTIONS:  - Educate patient/family on patient safety including physical limitations  - Keep bed low and locked with side rails adjusted as appropriate  - Keep care items and personal belongings within reach  - Initiate and maintain comfort rounds  - Apply yellow socks and bracelet for high fall risk patients  - Consider moving patient to room near nurses station  Outcome: Progressing

## 2022-11-18 NOTE — NURSING NOTE
Pt visible and social with peers; compliant with groups and medications  Happy to have a discharge date for Monday; although reports feeling upset about having to spend 23st birthday with [employer] (even though birthday is in March)  Denies SI/HI/AVH  Walks halls with peer

## 2022-11-18 NOTE — NURSING NOTE
Pt awake and social with peers throughout morning  Attending Community Meeting this morning, then returned to room and is currently napping  Loud at times, redirectable   Denies needs

## 2022-11-18 NOTE — NURSING NOTE
Patient presents alert, cooperative and intermittently visible throughout the community  She has a bright affect and is observed socializing with peers  Patient denies SI/HI/SIB and A/VH at this time  No depression or anxiety reported  No pain  VS WNL  No signs or symptoms of COVID-19  Patient able to utilize coping skills to cope with the uncertainty of living arrangements upon d/c  Love Guess remains hopeful r/t Neva  Patient is compliant with medication administration without prompting  Maintained on safe precautions without incident  Staff provides therapeutic support, positive encouragement, and a structured routine  Patient will continue working on treatment goals  Will continue to monitor progress

## 2022-11-19 RX ORDER — IBUPROFEN 200 MG
200 TABLET ORAL EVERY 6 HOURS PRN
Status: DISCONTINUED | OUTPATIENT
Start: 2022-11-19 | End: 2022-11-20

## 2022-11-19 RX ADMIN — FLUOXETINE 20 MG: 20 CAPSULE ORAL at 09:33

## 2022-11-19 RX ADMIN — Medication 3 MG: at 21:44

## 2022-11-19 RX ADMIN — OLANZAPINE 5 MG: 5 TABLET, FILM COATED ORAL at 21:44

## 2022-11-19 RX ADMIN — PRAZOSIN HYDROCHLORIDE 1 MG: 1 CAPSULE ORAL at 21:44

## 2022-11-19 RX ADMIN — NICOTINE POLACRILEX 2 MG: 2 GUM, CHEWING BUCCAL at 21:46

## 2022-11-19 NOTE — NURSING NOTE
Pt is bright, awake, alert  Has been social with peers  Discussed medications as pt reports being tired and napping throughout much of the day  Encouraged to further discuss with provider  Discussed trying to keep a routine to help promote sleep at night and increasing wakefulness during the day  Pt willing to try tomorrow   Denies SI, HI, AVH

## 2022-11-19 NOTE — TREATMENT TEAM
11/19/22 0900   Team Meeting   Meeting Type Daily Rounds   Team Members Present   Team Members Present Physician;Nurse   Physician Team Member Dr Xiang Granados Team Member Vito Vaca   Patient/Family Present   Patient Present No   Patient's Family Present No     AM rounds- denies SI/HI, visible and social on the unit  Attends groups  Slept well

## 2022-11-19 NOTE — NURSING NOTE
Pt remains withdrawn to room, sleeping at times throughout the day  Denies SI/HI, anxiety  Mild depression  Pt reports she had a poor night sleep last night and felt restless although did not notify staff  Encouraged to notify staff if having difficulty to sleep overnight  Pt denies any questions or concerns at this time

## 2022-11-19 NOTE — PROGRESS NOTES
[facility]  Scotty Fowler, [address]  [phone]    Progress Note - [address] 23 y o  female MRN: [id number]  Unit/Bed#: Louis Perdomo 255-02 Encounter: [id number]    This note was not shared with the patient due to reasonable likelihood of causing patient harm    Assessment/Plan   Principal Problem:    Bipolar disorder ([address] )  Active Problems:    Anxiety    PTSD (post-traumatic stress disorder)    Tobacco use      PLAN:   voluntary 201   Did not change patients  meds   Reported safety of the unit; vital per unit standards  Continue with group therapy, milieu therapy and occupational therapy  Coordination of Care with CM/SW   Disposition Planing/ Discharge :  Self Care to Home  - Pending Discharged Monday     Ordered Meds:  Ibuprofen for PRN headaches   Medications:   all current active meds have been reviewed and current meds:   Current Facility-Administered Medications   Medication Dose Route Frequency   • acetaminophen (TYLENOL) tablet 650 mg  650 mg Oral Q6H PRN   • acetaminophen (TYLENOL) tablet 650 mg  650 mg Oral Q4H PRN   • acetaminophen (TYLENOL) tablet 975 mg  975 mg Oral Q6H PRN   • albuterol (PROVENTIL HFA,VENTOLIN HFA) inhaler 2 puff  2 puff Inhalation Q4H PRN   • aluminum-magnesium hydroxide-simethicone (MYLANTA) oral suspension 30 mL  30 mL Oral Q4H PRN   • haloperidol lactate (HALDOL) injection 2 5 mg  2 5 mg Intramuscular Q4H PRN Max 4/day    And   • LORazepam (ATIVAN) injection 1 mg  1 mg Intramuscular Q4H PRN Max 4/day    And   • benztropine (COGENTIN) injection 0 5 mg  0 5 mg Intramuscular Q4H PRN Max 4/day   • haloperidol lactate (HALDOL) injection 5 mg  5 mg Intramuscular Q4H PRN Max 4/day    And   • LORazepam (ATIVAN) injection 2 mg  2 mg Intramuscular Q4H PRN Max 4/day    And   • benztropine (COGENTIN) injection 1 mg  1 mg Intramuscular Q4H PRN Max 4/day   • benztropine (COGENTIN) tablet 1 mg  1 mg Oral Q4H PRN Max 6/day   • bisacodyl (DULCOLAX) rectal suppository 10 mg  10 mg Rectal Daily PRN   • hydrOXYzine HCL (ATARAX) tablet 50 mg  50 mg Oral Q6H PRN Max 4/day    Or   • diphenhydrAMINE (BENADRYL) injection 50 mg  50 mg Intramuscular Q6H PRN   • FLUoxetine (PROzac) capsule 20 mg  20 mg Oral Daily   • haloperidol (HALDOL) tablet 1 mg  1 mg Oral Q6H PRN   • haloperidol (HALDOL) tablet 2 5 mg  2 5 mg Oral Q4H PRN Max 4/day   • haloperidol (HALDOL) tablet 5 mg  5 mg Oral Q4H PRN Max 4/day   • hydrOXYzine HCL (ATARAX) tablet 100 mg  100 mg Oral Q6H PRN Max 4/day    Or   • LORazepam (ATIVAN) injection 2 mg  2 mg Intramuscular Q6H PRN   • hydrOXYzine HCL (ATARAX) tablet 25 mg  25 mg Oral Q6H PRN Max 4/day   • ibuprofen (MOTRIN) tablet 200 mg  200 mg Oral Q6H PRN   • melatonin tablet 3 mg  3 mg Oral HS   • nicotine (NICODERM CQ) 21 mg/24 hr TD 24 hr patch 21 mg  21 mg Transdermal Daily   • nicotine polacrilex (NICORETTE) gum 2 mg  2 mg Oral Q2H PRN   • OLANZapine (ZyPREXA) tablet 5 mg  5 mg Oral HS   • polyethylene glycol (MIRALAX) packet 17 g  17 g Oral Daily PRN   • prazosin (MINIPRESS) capsule 1 mg  1 mg Oral HS   • senna-docusate sodium (SENOKOT S) 8 6-50 mg per tablet 1 tablet  1 tablet Oral Daily PRN   • traZODone (DESYREL) tablet 50 mg  50 mg Oral HS PRN       Behavior over the last 24 hours:  improved   Per RN note" AM rounds- denies SI/HI, visible and social on the unit  Attends groups  "Pt remains withdrawn to room, sleeping at times throughout the day  Medication Compliance: Yes  ( Olanzapine, Fluoxetine, Prazosin)   Sleep: normal  Appetite: normal  ROS: no complaints    Subjective: Venu  was not visible on the unit , and was later seen in her room, laying down  Sat up while talking  Reported sleeping thru the night, Denied SI, HI  Had complaints of " feeling groggy and intermittent headaches unknown cause  Was informed of PRN scheduled, encouraged trying Ibuprofen  Venu  was understanding       Mental Status Evaluation:  General Appearance:   Gerald Rodriguez is a 23 y o   female look stated age , non disheveled, non malodorous casually dressed  Wearing glasses    Behavior:  normal and cooperative    Speech:  normal pitch, normal volume and WNL rate, prosody    Mood:  normal   Affect:  mood-congruent   Thought Process:  normal and logical   Thought Content:  normal, non-delusional    Perceptual Disturbances: None   Risk Potential: Suicidal Ideations none  Homicidal Ideations none  Potential for Aggression No   Sensorium:  person, place, time/date and situation   Memory:  recent and remote memory grossly intact   Consciousness:  alert and awake    Attention: SL IP AttentionSpan: attention span and concentration are age appropriate   Insight:  fair   Judgment: fair   Gait/Station: normal gait/station   Motor Activity: no abnormal movements       Vitals:    11/18/22 1514 11/18/22 2100 11/19/22 0811 11/19/22 1437   BP: 123/81 130/89 107/71 139/75   BP Location: Left arm Left arm Left arm Left arm   Pulse: 89 (!) 106 67 104   Resp: 16 18 18 18   Temp: 99 2 °F (37 3 °C)  97 7 °F (36 5 °C) 98 3 °F (36 8 °C)   TempSrc: Tympanic  Tympanic Tympanic   SpO2: 96% 97%     Weight:       Height:           Labs: I have personally reviewed all pertinent laboratory/tests results     Most Recent Labs:   Lab Results   Component Value Date    WBC 7 06 09/09/2022    RBC 4 26 09/09/2022    HGB 12 5 09/09/2022    HCT 36 3 09/09/2022     09/09/2022    RDW 13 2 09/09/2022    NEUTROABS 3 85 09/09/2022    SODIUM 139 09/09/2022    K 3 9 09/09/2022     09/09/2022    CO2 29 09/09/2022    BUN 13 09/09/2022    CREATININE 0 62 09/09/2022    GLUC 102 09/09/2022    CALCIUM 9 2 09/09/2022    AST 11 (L) 09/09/2022    ALT 7 09/09/2022    ALKPHOS 61 09/09/2022    TP 6 7 09/09/2022    ALB 4 4 09/09/2022    TBILI 0 35 09/09/2022    CHOLESTEROL 182 11/13/2022    HDL 52 11/13/2022    TRIG 48 11/13/2022    LDLCALC 120 (H) 11/13/2022    [place] 130 11/13/2022 [id number] 1 023 11/18/2022    PREGSERUM Negative 08/25/2022    RPR Non-Reactive 11/13/2022    HGBA1C 5 0 08/25/2022    EAG 97 08/25/2022     Progress Toward Goals:  Improved       Counseling / Coordination of Care  FacetoFace time: 7:23PM-7:29PM; Documentation Time: 9:17 PM-9:31 PM  Total floor / unit time spent today  6  minutes  Greater than 50% of total time was spent with the patient and / or family counseling and / or coordination of care   A description of the counseling / coordination of care: Risk assessment, stabilization planning, treatment management

## 2022-11-20 RX ORDER — IBUPROFEN 200 MG
200 TABLET ORAL EVERY 6 HOURS PRN
Status: DISCONTINUED | OUTPATIENT
Start: 2022-11-20 | End: 2022-11-21 | Stop reason: HOSPADM

## 2022-11-20 RX ADMIN — OLANZAPINE 5 MG: 5 TABLET, FILM COATED ORAL at 21:27

## 2022-11-20 RX ADMIN — PRAZOSIN HYDROCHLORIDE 1 MG: 1 CAPSULE ORAL at 21:27

## 2022-11-20 RX ADMIN — Medication 21 MG: at 09:22

## 2022-11-20 RX ADMIN — Medication 3 MG: at 21:28

## 2022-11-20 RX ADMIN — FLUOXETINE 20 MG: 20 CAPSULE ORAL at 09:22

## 2022-11-20 NOTE — PLAN OF CARE
Problem: SELF HARM/SUICIDALITY  Goal: Will have no self-injury during hospital stay  Description: INTERVENTIONS:  - Q 15 MINUTES: Routine safety checks  - Q WAKING SHIFT & PRN: Assess risk to determine if routine checks are adequate to maintain patient safety  - Encourage patient to participate actively in care by formulating a plan to combat response to suicidal ideation, identify supports and resources  Outcome: Progressing     Problem: DEPRESSION  Goal: Will be euthymic at discharge  Description: INTERVENTIONS:  - Administer medication as ordered  - Provide emotional support via 1:1 interaction with staff  - Encourage involvement in milieu/groups/activities  - Monitor for social isolation  Outcome: Progressing     Problem: BEHAVIOR  Goal: Pt/Family maintain appropriate behavior and adhere to behavioral management agreement, if implemented  Description: INTERVENTIONS:  - Assess the family dynamic   - Encourage verbalization of thoughts and concerns in a socially appropriate manner  - Assess patient/family's coping skills and non-compliant behavior (including use of illegal substances)  - Utilize positive, consistent limit setting strategies supporting safety of patient, staff and others  - Initiate consult with Case Management, Spiritual Care or other ancillary services as appropriate  - If a patient's/visitor's behavior jeopardizes the safety of the patient, staff, or others, refer to organization procedure     - Notify Security of behavior or suspected illegal substances which indicate the need for search of the patient and/or belongings  - Encourage participation in the decision making process about a behavioral management agreement; implement if patient meets criteria  Outcome: Progressing     Problem: ANXIETY  Goal: Will report anxiety at manageable levels  Description: INTERVENTIONS:  - Administer medication as ordered  - Teach and encourage coping skills  - Provide emotional support  - Assess patient/family for anxiety and ability to cope  Outcome: Progressing  Goal: By discharge: Patient will verbalize 2 strategies to deal with anxiety  Description: Interventions:  - Identify any obvious source/trigger to anxiety  - Staff will assist patient in applying identified coping technique/skills  - Encourage attendance of scheduled groups and activities  Outcome: Progressing     Problem: SLEEP DISTURBANCE  Goal: Will exhibit normal sleeping pattern  Description: Interventions:  -  Assess the patients sleep pattern, noting recent changes  - Administer medication as ordered  - Decrease environmental stimuli, including noise, as appropriate during the night  - Encourage the patient to actively participate in unit groups and or exercise during the day to enhance ability to achieve adequate sleep at night  - Assess the patient, in the morning, encouraging a description of sleep experience  Outcome: Progressing     Problem: SELF CARE DEFICIT  Goal: Return ADL status to a safe level of function  Description: INTERVENTIONS:  - Administer medication as ordered  - Assess ADL deficits and provide assistive devices as needed  - Obtain PT/OT consults as needed  - Assist and instruct patient to increase activity and self care as tolerated  Outcome: Progressing     Problem: DISCHARGE PLANNING  Goal: Discharge to home or other facility with appropriate resources  Description: INTERVENTIONS:  - Identify barriers to discharge w/patient and caregiver  - Arrange for needed discharge resources and transportation as appropriate  - Identify discharge learning needs (meds, wound care, etc )  - Arrange for interpretive services to assist at discharge as needed  - Refer to Case Management Department for coordinating discharge planning if the patient needs post-hospital services based on physician/advanced practitioner order or complex needs related to functional status, cognitive ability, or social support system  Outcome: Progressing Problem: SAFETY ADULT  Goal: Patient will remain free of falls  Description: INTERVENTIONS:  - Educate patient/family on patient safety including physical limitations  - Instruct patient to call for assistance with activity   - Consult OT/PT to assist with strengthening/mobility   - Keep Call bell within reach  - Keep bed low and locked with side rails adjusted as appropriate  - Keep care items and personal belongings within reach  - Initiate and maintain comfort rounds  - Make Fall Risk Sign visible to staff  - Apply yellow socks and bracelet for high fall risk patients  - Consider moving patient to room near nurses station  Outcome: Progressing

## 2022-11-20 NOTE — TREATMENT TEAM
11/20/22 0900   Team Meeting   Meeting Type Daily Rounds   Team Members Present   Team Members Present Physician;Nurse   Physician Team Member Dr Lawson Marrow Team Member Adriel Peña   Patient/Family Present   Patient Present No   Patient's Family Present No     AM rounds- denies SI/HI, anxiety and depression  Slept at times throughout the day, reports feeling tired on medications  Pt social with peers in evening  Slept well overnight

## 2022-11-20 NOTE — PROGRESS NOTES
[facility]  Scotty Fowler, [address]  [phone]    Progress Note - [address] 23 y o  female MRN: [id number]  Unit/Bed#: Sean Glover 255-02 Encounter: [id number]    This note was not shared with the patient due to reasonable likelihood of causing patient harm    Assessment/Plan   Principal Problem:    Bipolar disorder ([address] )  Active Problems:    Anxiety    PTSD (post-traumatic stress disorder)    Tobacco use      PLAN:   Did not change patients  meds   voluntary 201   Reported safety of the unit; vital per unit standards  Continue with group therapy, milieu therapy and occupational therapy  Coordination of Care with CM/SW   Disposition Planing/ Discharge :  Self Care to Home  - Pending Discharged Monday     Ordered Meds:  Ibuprofen for PRN headaches   Medications:   all current active meds have been reviewed and current meds:   Current Facility-Administered Medications   Medication Dose Route Frequency   • acetaminophen (TYLENOL) tablet 650 mg  650 mg Oral Q6H PRN   • acetaminophen (TYLENOL) tablet 650 mg  650 mg Oral Q4H PRN   • acetaminophen (TYLENOL) tablet 975 mg  975 mg Oral Q6H PRN   • albuterol (PROVENTIL HFA,VENTOLIN HFA) inhaler 2 puff  2 puff Inhalation Q4H PRN   • aluminum-magnesium hydroxide-simethicone (MYLANTA) oral suspension 30 mL  30 mL Oral Q4H PRN   • haloperidol lactate (HALDOL) injection 2 5 mg  2 5 mg Intramuscular Q4H PRN Max 4/day    And   • LORazepam (ATIVAN) injection 1 mg  1 mg Intramuscular Q4H PRN Max 4/day    And   • benztropine (COGENTIN) injection 0 5 mg  0 5 mg Intramuscular Q4H PRN Max 4/day   • haloperidol lactate (HALDOL) injection 5 mg  5 mg Intramuscular Q4H PRN Max 4/day    And   • LORazepam (ATIVAN) injection 2 mg  2 mg Intramuscular Q4H PRN Max 4/day    And   • benztropine (COGENTIN) injection 1 mg  1 mg Intramuscular Q4H PRN Max 4/day   • benztropine (COGENTIN) tablet 1 mg  1 mg Oral Q4H PRN Max 6/day   • bisacodyl (DULCOLAX) rectal suppository 10 mg  10 mg Rectal Daily PRN   • hydrOXYzine HCL (ATARAX) tablet 50 mg  50 mg Oral Q6H PRN Max 4/day    Or   • diphenhydrAMINE (BENADRYL) injection 50 mg  50 mg Intramuscular Q6H PRN   • FLUoxetine (PROzac) capsule 20 mg  20 mg Oral Daily   • haloperidol (HALDOL) tablet 1 mg  1 mg Oral Q6H PRN   • haloperidol (HALDOL) tablet 2 5 mg  2 5 mg Oral Q4H PRN Max 4/day   • haloperidol (HALDOL) tablet 5 mg  5 mg Oral Q4H PRN Max 4/day   • hydrOXYzine HCL (ATARAX) tablet 100 mg  100 mg Oral Q6H PRN Max 4/day    Or   • LORazepam (ATIVAN) injection 2 mg  2 mg Intramuscular Q6H PRN   • hydrOXYzine HCL (ATARAX) tablet 25 mg  25 mg Oral Q6H PRN Max 4/day   • ibuprofen (MOTRIN) tablet 200 mg  200 mg Oral Q6H PRN   • melatonin tablet 3 mg  3 mg Oral HS   • nicotine (NICODERM CQ) 21 mg/24 hr TD 24 hr patch 21 mg  21 mg Transdermal Daily   • nicotine polacrilex (NICORETTE) gum 2 mg  2 mg Oral Q2H PRN   • OLANZapine (ZyPREXA) tablet 5 mg  5 mg Oral HS   • polyethylene glycol (MIRALAX) packet 17 g  17 g Oral Daily PRN   • prazosin (MINIPRESS) capsule 1 mg  1 mg Oral HS   • senna-docusate sodium (SENOKOT S) 8 6-50 mg per tablet 1 tablet  1 tablet Oral Daily PRN   • traZODone (DESYREL) tablet 50 mg  50 mg Oral HS PRN       Behavior over the last 24 hours:  improved   Per RN note" AM rounds- denies SI/HI, anxiety and depression  Slept at times throughout the day, reports feeling tired on medications  Pt social with peers in evening  Slept well overnight     Medication Compliance: Yes  ( Olanzapine, Fluoxetine, Prazosin)   Sleep: normal  Appetite: normal  ROS: no complaints    Subjective: Rupa Brantley was visible on the unit ( SLQ)  Was seen watch television in the day room  Pleasant on approach  Was later seen individually in the seclusion room  Reported she was watching [employer] Mood is stable   Without medication complaints  Slept through the night   Denied SI, HI  There's plans or discharge tomorrow    [facility]  Donna Baker reported learning coping skills to speak up, and its okay to ask for help        Mental Status Evaluation:  General Appearance:   Alvarez Barron is a 23 y o   female look stated age , non disheveled, non malodorous casually dressed  Wearing glasses    Behavior:  normal and cooperative    Speech:  normal pitch, normal volume and WNL rate, prosody    Mood:  normal   Affect:  mood-congruent   Thought Process:  normal and logical   Thought Content:  normal, non-delusional    Perceptual Disturbances: None   Risk Potential: Suicidal Ideations none  Homicidal Ideations none  Potential for Aggression No   Sensorium:  person, place, time/date and situation   Memory:  recent and remote memory grossly intact   Consciousness:  alert and awake    Attention: SL IP AttentionSpan: attention span and concentration are age appropriate   Insight:  fair   Judgment: fair   Gait/Station: normal gait/station   Motor Activity: no abnormal movements       Vitals:    11/19/22 0811 11/19/22 1437 11/19/22 2144 11/20/22 0806   BP: 107/71 139/75 130/77 110/60   BP Location: Left arm Left arm  Right arm   Pulse: 67 104  66   Resp: 18 18  18   Temp: 97 7 °F (36 5 °C) 98 3 °F (36 8 °C)  97 8 °F (36 6 °C)   TempSrc: Tympanic Tympanic  Tympanic   SpO2:    98%   Weight:       Height:           Labs: I have personally reviewed all pertinent laboratory/tests results     Most Recent Labs:   Lab Results   Component Value Date    WBC 7 06 09/09/2022    RBC 4 26 09/09/2022    HGB 12 5 09/09/2022    HCT 36 3 09/09/2022     09/09/2022    RDW 13 2 09/09/2022    NEUTROABS 3 85 09/09/2022    SODIUM 139 09/09/2022    K 3 9 09/09/2022     09/09/2022    CO2 29 09/09/2022    BUN 13 09/09/2022    CREATININE 0 62 09/09/2022    GLUC 102 09/09/2022    CALCIUM 9 2 09/09/2022    AST 11 (L) 09/09/2022    ALT 7 09/09/2022    ALKPHOS 61 09/09/2022    TP 6 7 09/09/2022    ALB 4 4 09/09/2022    TBILI 0 35 09/09/2022    CHOLESTEROL 182 11/13/2022    HDL 52 11/13/2022    TRIG 48 11/13/2022    LDLCALC 120 (H) 11/13/2022    NONHDLC 130 11/13/2022    [id number] 1 023 11/18/2022    PREGSERUM Negative 08/25/2022    RPR Non-Reactive 11/13/2022    HGBA1C 5 0 08/25/2022    EAG 97 08/25/2022     Progress Toward Goals:  Improved       Counseling / Coordination of Care  FacetoFace time: 9:48 AM- 9:56 AM; Documentation Time: 12:21 PM-12:29 PM  Total floor / unit time spent today 8 minutes  Greater than 50% of total time was spent with the patient and / or family counseling and / or coordination of care   A description of the counseling / coordination of care: Risk assessment, stabilization planning, treatment management

## 2022-11-20 NOTE — NURSING NOTE
Patient appears to have slept throughout the night, without periods of restlessness observed  Patient is still sleeping now  Q7 minute observational rounds maintained for promotion of patient safety

## 2022-11-20 NOTE — NURSING NOTE
Pt remains withdrawn to room, sleeping majority of the morning  Pt states she typically likes to sleep in late and denies any difficulty sleeping overnight  Denies SI/HI, anxiety and depression  Denies AVH  Encouraged pt out of bed to socialize with peers and attend groups  Pt states she plans on discharging from the hospital tomorrow and is looking forward to this

## 2022-11-20 NOTE — NURSING NOTE
Pt is resting in bed during interaction  Pt was more visible and awake for several groups, social with peers throughout afternoon/early evening  Walks halls  Pt asking questions regarding discharge process, writer explained contents of discharge folder and the typical events on discharge date  No further questions

## 2022-11-21 VITALS
SYSTOLIC BLOOD PRESSURE: 113 MMHG | WEIGHT: 166.4 LBS | OXYGEN SATURATION: 96 % | HEIGHT: 62 IN | RESPIRATION RATE: 18 BRPM | DIASTOLIC BLOOD PRESSURE: 79 MMHG | TEMPERATURE: 96.7 F | BODY MASS INDEX: 30.62 KG/M2 | HEART RATE: 74 BPM

## 2022-11-21 RX ADMIN — FLUOXETINE 20 MG: 20 CAPSULE ORAL at 09:28

## 2022-11-21 NOTE — NURSING NOTE
Pt expressed readiness for discharge  AVS reviewed with pt  Denies any question or concerns  Pt discharge from unit via lyft

## 2022-11-21 NOTE — PROGRESS NOTES
Pt looking forward to dc  Pt close to another peer, needing redirection  DC: Today to [address] [email or web address]    11/21/22 7715   Team Meeting   Meeting Type Daily Rounds   Team Members Present   Team Members Present Physician;Nurse;; Other (Discipline and Name)   Physician Team Member Dr Linh Bundy / Hal Grant Team Member [facility] Management Team Member Shanell Salinas / Samantha Piña   Other (Discipline and Name) [place] [facility]   Patient/Family Present   Patient Present No   Patient's Family Present No

## 2022-11-21 NOTE — NURSING NOTE
Pt remains pleasant and social  Reports she slept well and is looking forward to discharging today  Pt denies SI/HI, anxiety and depression  Denies any questions at this time

## 2022-11-21 NOTE — PROGRESS NOTES
Pt bright upon approach  Denies all symptoms  Attends groups  Social with peers and staff  DC: Monday 11/18/22 0802   Team Meeting   Meeting Type Daily Rounds   Team Members Present   Team Members Present Physician;Nurse;; Other (Discipline and Name)   Physician Team Member Dr Joe Holloway / Alexx David Team Member [facility] Management Team Member Radha Wynn / Hilaria Monahan   Other (Discipline and Name) [place] [facility]   Patient/Family Present   Patient Present No   Patient's Family Present No

## 2022-11-21 NOTE — TREATMENT TEAM
11/21/22 1000   Activity/Group Checklist   Group Community meeting   Attendance Attended   Attendance Duration (min) 16-30   Interactions Interacted appropriately  (pt was very excited about her DC today to the [facility]  she set goald pertaining for her preparation for the [facility] and what she needed to do to stay engaged in her programming there )   Affect/Mood Appropriate;Bright   Goals Achieved Identified feelings; Able to listen to others; Able to engage in interactions; Other (Comment)  (identified goals for the day)

## 2022-11-21 NOTE — CASE MANAGEMENT
TRES spoke to [facility] from [facility] ( [facility]  ) to confirm pt dc to their [facility] program today  CM informed her that pt has 1 month supply of her medications and then pt will be transported to their facility around 10:30am today  Angie in agreement with that plan and they will be waiting for pt when she arrives

## 2022-11-21 NOTE — CASE MANAGEMENT
TRES met with pt to check in about dc plans for today  Pt bright, aravind and smiling upon approach  TRES informed pt that she will dc with a 1 month supply of her medications and [facility] will assist her with follow up appointments for after she is discharged and situated in their program      Pt reported she is excited to dc and "very ready  "     TRES informed pt that she will be transported around 10:30am today and when she arrives she should go to the  and they will be waiting for her

## 2022-11-21 NOTE — PLAN OF CARE
Problem: SELF HARM/SUICIDALITY  Goal: Will have no self-injury during hospital stay  Description: INTERVENTIONS:  - Q 15 MINUTES: Routine safety checks  - Q WAKING SHIFT & PRN: Assess risk to determine if routine checks are adequate to maintain patient safety  - Encourage patient to participate actively in care by formulating a plan to combat response to suicidal ideation, identify supports and resources  Outcome: Adequate for Discharge     Problem: DEPRESSION  Goal: Will be euthymic at discharge  Description: INTERVENTIONS:  - Administer medication as ordered  - Provide emotional support via 1:1 interaction with staff  - Encourage involvement in milieu/groups/activities  - Monitor for social isolation  Outcome: Adequate for Discharge     Problem: BEHAVIOR  Goal: Pt/Family maintain appropriate behavior and adhere to behavioral management agreement, if implemented  Description: INTERVENTIONS:  - Assess the family dynamic   - Encourage verbalization of thoughts and concerns in a socially appropriate manner  - Assess patient/family's coping skills and non-compliant behavior (including use of illegal substances)  - Utilize positive, consistent limit setting strategies supporting safety of patient, staff and others  - Initiate consult with Case Management, Spiritual Care or other ancillary services as appropriate  - If a patient's/visitor's behavior jeopardizes the safety of the patient, staff, or others, refer to organization procedure     - Notify Security of behavior or suspected illegal substances which indicate the need for search of the patient and/or belongings  - Encourage participation in the decision making process about a behavioral management agreement; implement if patient meets criteria  Outcome: Adequate for Discharge     Problem: ANXIETY  Goal: Will report anxiety at manageable levels  Description: INTERVENTIONS:  - Administer medication as ordered  - Teach and encourage coping skills  - Provide emotional support  - Assess patient/family for anxiety and ability to cope  Outcome: Adequate for Discharge  Goal: By discharge: Patient will verbalize 2 strategies to deal with anxiety  Description: Interventions:  - Identify any obvious source/trigger to anxiety  - Staff will assist patient in applying identified coping technique/skills  - Encourage attendance of scheduled groups and activities  Outcome: Adequate for Discharge     Problem: SLEEP DISTURBANCE  Goal: Will exhibit normal sleeping pattern  Description: Interventions:  -  Assess the patients sleep pattern, noting recent changes  - Administer medication as ordered  - Decrease environmental stimuli, including noise, as appropriate during the night  - Encourage the patient to actively participate in unit groups and or exercise during the day to enhance ability to achieve adequate sleep at night  - Assess the patient, in the morning, encouraging a description of sleep experience  Outcome: Adequate for Discharge     Problem: SELF CARE DEFICIT  Goal: Return ADL status to a safe level of function  Description: INTERVENTIONS:  - Administer medication as ordered  - Assess ADL deficits and provide assistive devices as needed  - Obtain PT/OT consults as needed  - Assist and instruct patient to increase activity and self care as tolerated  Outcome: Adequate for Discharge     Problem: DISCHARGE PLANNING  Goal: Discharge to home or other facility with appropriate resources  Description: INTERVENTIONS:  - Identify barriers to discharge w/patient and caregiver  - Arrange for needed discharge resources and transportation as appropriate  - Identify discharge learning needs (meds, wound care, etc )  - Arrange for interpretive services to assist at discharge as needed  - Refer to Case Management Department for coordinating discharge planning if the patient needs post-hospital services based on physician/advanced practitioner order or complex needs related to functional status, cognitive ability, or social support system  Outcome: Adequate for Discharge     Problem: SAFETY ADULT  Goal: Patient will remain free of falls  Description: INTERVENTIONS:  - Educate patient/family on patient safety including physical limitations  - Instruct patient to call for assistance with activity   - Consult OT/PT to assist with strengthening/mobility   - Keep Call bell within reach  - Keep bed low and locked with side rails adjusted as appropriate  - Keep care items and personal belongings within reach  - Initiate and maintain comfort rounds  - Make Fall Risk Sign visible to staff  - Offer Toileting every  Hours, in advance of need  - Initiate/Maintain alarm  - Obtain necessary fall risk management equipment:   - Apply yellow socks and bracelet for high fall risk patients  - Consider moving patient to room near nurses station  Outcome: Adequate for Discharge

## 2022-12-06 ENCOUNTER — APPOINTMENT (EMERGENCY)
Dept: CT IMAGING | Facility: HOSPITAL | Age: 22
End: 2022-12-06

## 2022-12-06 ENCOUNTER — HOSPITAL ENCOUNTER (EMERGENCY)
Facility: HOSPITAL | Age: 22
Discharge: HOME | End: 2022-12-06
Attending: EMERGENCY MEDICINE

## 2022-12-06 VITALS
TEMPERATURE: 97.3 F | WEIGHT: 175 LBS | SYSTOLIC BLOOD PRESSURE: 121 MMHG | OXYGEN SATURATION: 99 % | HEART RATE: 87 BPM | BODY MASS INDEX: 32.2 KG/M2 | RESPIRATION RATE: 18 BRPM | HEIGHT: 62 IN | DIASTOLIC BLOOD PRESSURE: 82 MMHG

## 2022-12-06 DIAGNOSIS — R10.9 ABDOMINAL PAIN: Primary | ICD-10-CM

## 2022-12-06 LAB
ALBUMIN SERPL BCP-MCNC: 4.4 G/DL (ref 3.5–5)
ALP SERPL-CCNC: 65 U/L (ref 34–104)
ALT SERPL W P-5'-P-CCNC: 12 U/L (ref 7–52)
ANION GAP SERPL CALCULATED.3IONS-SCNC: 7 MMOL/L (ref 4–13)
AST SERPL W P-5'-P-CCNC: 15 U/L (ref 13–39)
BACTERIA UR QL AUTO: ABNORMAL /HPF
BASOPHILS # BLD AUTO: 0.01 THOUSANDS/ÂΜL (ref 0–0.1)
BASOPHILS NFR BLD AUTO: 0 % (ref 0–1)
BILIRUB SERPL-MCNC: 0.23 MG/DL (ref 0.2–1)
BILIRUB UR QL STRIP: NEGATIVE
BUN SERPL-MCNC: 12 MG/DL (ref 5–25)
CALCIUM SERPL-MCNC: 9.5 MG/DL (ref 8.4–10.2)
CHLORIDE SERPL-SCNC: 105 MMOL/L (ref 96–108)
CLARITY UR: CLEAR
CO2 SERPL-SCNC: 27 MMOL/L (ref 21–32)
COLOR UR: YELLOW
CREAT SERPL-MCNC: 0.53 MG/DL (ref 0.6–1.3)
EOSINOPHIL # BLD AUTO: 0.11 THOUSAND/ÂΜL (ref 0–0.61)
EOSINOPHIL NFR BLD AUTO: 2 % (ref 0–6)
ERYTHROCYTE [DISTWIDTH] IN BLOOD BY AUTOMATED COUNT: 12.7 % (ref 11.6–15.1)
EXT PREGNANCY TEST URINE: NEGATIVE
EXT. CONTROL: NORMAL
GFR SERPL CREATININE-BSD FRML MDRD: 137 ML/MIN/1.73SQ M
GLUCOSE SERPL-MCNC: 83 MG/DL (ref 65–140)
GLUCOSE UR STRIP-MCNC: NEGATIVE MG/DL
HCT VFR BLD AUTO: 38.9 % (ref 34.8–46.1)
HGB BLD-MCNC: 13.1 G/DL (ref 11.5–15.4)
HGB UR QL STRIP.AUTO: ABNORMAL
IMM GRANULOCYTES # BLD AUTO: 0.01 THOUSAND/UL (ref 0–0.2)
IMM GRANULOCYTES NFR BLD AUTO: 0 % (ref 0–2)
KETONES UR STRIP-MCNC: NEGATIVE MG/DL
LEUKOCYTE ESTERASE UR QL STRIP: NEGATIVE
LIPASE SERPL-CCNC: 29 U/L (ref 11–82)
LYMPHOCYTES # BLD AUTO: 2.81 THOUSANDS/ÂΜL (ref 0.6–4.47)
LYMPHOCYTES NFR BLD AUTO: 41 % (ref 14–44)
MCH RBC QN AUTO: 29.2 PG (ref 26.8–34.3)
MCHC RBC AUTO-ENTMCNC: 33.7 G/DL (ref 31.4–37.4)
MCV RBC AUTO: 87 FL (ref 82–98)
MONOCYTES # BLD AUTO: 0.52 THOUSAND/ÂΜL (ref 0.17–1.22)
MONOCYTES NFR BLD AUTO: 8 % (ref 4–12)
NEUTROPHILS # BLD AUTO: 3.43 THOUSANDS/ÂΜL (ref 1.85–7.62)
NEUTS SEG NFR BLD AUTO: 49 % (ref 43–75)
NITRITE UR QL STRIP: NEGATIVE
NON-SQ EPI CELLS URNS QL MICRO: ABNORMAL /HPF
NRBC BLD AUTO-RTO: 0 /100 WBCS
PH UR STRIP.AUTO: 6 [PH]
PLATELET # BLD AUTO: 291 THOUSANDS/UL (ref 149–390)
PMV BLD AUTO: 10.1 FL (ref 8.9–12.7)
POTASSIUM SERPL-SCNC: 4 MMOL/L (ref 3.5–5.3)
PROT SERPL-MCNC: 7.3 G/DL (ref 6.4–8.4)
PROT UR STRIP-MCNC: NEGATIVE MG/DL
RBC # BLD AUTO: 4.48 MILLION/UL (ref 3.81–5.12)
RBC #/AREA URNS AUTO: ABNORMAL /HPF
SODIUM SERPL-SCNC: 139 MMOL/L (ref 135–147)
SP GR UR STRIP.AUTO: >=1.03
UROBILINOGEN UR QL STRIP.AUTO: 0.2 E.U./DL
WBC # BLD AUTO: 6.89 THOUSAND/UL (ref 4.31–10.16)
WBC #/AREA URNS AUTO: ABNORMAL /HPF

## 2022-12-06 NOTE — DISCHARGE INSTRUCTIONS
Return to ED if worsening symptoms  Please follow-up with your PCP for continued right lower quadrant pain

## 2022-12-06 NOTE — ED PROVIDER NOTES
History  Chief Complaint   Patient presents with   • Abdominal Pain     Pt reports abd pain in lower right quadrant for 2 days     Patient is a 28-year-old female that reports that for the past 2 days she has had right lower quadrant pain that is intermittent  Patient reports that she had her girlfriend who is an EMT examine her and was told that her right lower was for  Patient denies any surgeries on her abdomen  Patient denies any chance of pregnancy stating that she is not sexually active  Patient that she recently had her menstural cycle  Patient denies any urinary symptoms  Patient denies any blood in urine or stool  Patient denies any nausea vomiting diarrhea  Patient reporting that she was told by her ENT girlfriend that she had to come to the emergency room and rule out appendicitis  Prior to Admission Medications   Prescriptions Last Dose Informant Patient Reported? Taking? FLUoxetine (PROzac) 20 mg capsule   No No   Sig: Take 1 capsule (20 mg total) by mouth daily Do not start before November 19, 2022  OLANZapine (ZyPREXA) 5 mg tablet   No No   Sig: Take 1 tablet (5 mg total) by mouth daily at bedtime   albuterol (ProAir HFA) 90 mcg/act inhaler   No No   Sig: Inhale 2 puffs every 4 (four) hours as needed for wheezing   hydrOXYzine HCL (ATARAX) 25 mg tablet   No No   Sig: Take 1 tablet (25 mg total) by mouth every 6 (six) hours as needed for anxiety   melatonin 3 mg   No No   Sig: Take 1 tablet (3 mg total) by mouth daily at bedtime   prazosin (MINIPRESS) 1 mg capsule   No No   Sig: Take 1 capsule (1 mg total) by mouth daily at bedtime      Facility-Administered Medications: None       Past Medical History:   Diagnosis Date   • Anxiety    • Asthma    • Bipolar disorder (HCC)    • Depression    • PTSD (post-traumatic stress disorder)        No past surgical history on file      Family History   Problem Relation Age of Onset   • ADD / ADHD Mother    • Anxiety disorder Mother    • Depression Mother    • Psychiatric Illness Mother    • Suicide Attempts Mother    • Hypertension Mother    • Bipolar disorder Father    • No Known Problems Half-Sister    • Seizures Half-Sister      I have reviewed and agree with the history as documented  E-Cigarette/Vaping   • E-Cigarette Use Former User    • Comments pt reports quitting  -11/12/22      E-Cigarette/Vaping Substances   • Nicotine No    • THC No    • CBD No    • Flavoring No    • Other No    • Unknown No      Social History     Tobacco Use   • Smoking status: Former   • Smokeless tobacco: Never   Vaping Use   • Vaping Use: Former   Substance Use Topics   • Alcohol use: Not Currently     Comment: denies -11/12/22   • Drug use: Not Currently     Types: Marijuana     Comment: quit 1 mos ago  - 11/12/22       Review of Systems   Constitutional: Negative  HENT: Negative  Eyes: Negative  Respiratory: Negative  Cardiovascular: Negative  Gastrointestinal: Positive for abdominal pain  Negative for diarrhea, nausea and vomiting  Endocrine: Negative  Genitourinary: Negative for dysuria  Musculoskeletal: Negative  Skin: Negative  Allergic/Immunologic: Negative  Neurological: Negative  Hematological: Negative  Psychiatric/Behavioral: Negative  All other systems reviewed and are negative  Physical Exam  Physical Exam  Vitals and nursing note reviewed  Constitutional:       General: She is in acute distress  Appearance: She is well-developed and normal weight  She is not toxic-appearing  HENT:      Mouth/Throat:      Mouth: Mucous membranes are moist    Eyes:      Extraocular Movements: Extraocular movements intact  Cardiovascular:      Rate and Rhythm: Normal rate and regular rhythm  Heart sounds: Normal heart sounds  Pulmonary:      Effort: Pulmonary effort is normal       Breath sounds: Normal breath sounds  Abdominal:      General: Abdomen is flat   Bowel sounds are normal  There is no distension  There are no signs of injury  Palpations: Abdomen is soft  Tenderness: There is abdominal tenderness in the right lower quadrant  There is no right CVA tenderness or left CVA tenderness  Hernia: No hernia is present  Skin:     General: Skin is warm  Capillary Refill: Capillary refill takes less than 2 seconds  Neurological:      General: No focal deficit present  Mental Status: She is alert and oriented to person, place, and time     Psychiatric:         Mood and Affect: Mood normal          Vital Signs  ED Triage Vitals [12/06/22 1658]   Temperature Pulse Respirations Blood Pressure SpO2   (!) 97 3 °F (36 3 °C) 87 18 121/82 98 %      Temp Source Heart Rate Source Patient Position - Orthostatic VS BP Location FiO2 (%)   Tympanic Monitor -- -- --      Pain Score       5           Vitals:    12/06/22 1658   BP: 121/82   Pulse: 87         Visual Acuity      ED Medications  Medications - No data to display    Diagnostic Studies  Results Reviewed     Procedure Component Value Units Date/Time    Urine Microscopic [[id number]]  (Abnormal) Collected: 12/06/22 1723    Lab Status: Final result Specimen: Urine, Clean Catch Updated: 12/06/22 1803     RBC, UA 4-10 /hpf      WBC, UA 1-2 /hpf      Epithelial Cells Occasional /hpf      Bacteria, UA Occasional /hpf     Comprehensive metabolic panel [[id number]]  (Abnormal) Collected: 12/06/22 1711    Lab Status: Final result Specimen: Blood from Arm, Left Updated: 12/06/22 1734     Sodium 139 mmol/L      Potassium 4 0 mmol/L      Chloride 105 mmol/L      CO2 27 mmol/L      ANION GAP 7 mmol/L      BUN 12 mg/dL      Creatinine 0 53 mg/dL      Glucose 83 mg/dL      Calcium 9 5 mg/dL      AST 15 U/L      ALT 12 U/L      Alkaline Phosphatase 65 U/L      Total Protein 7 3 g/dL      Albumin 4 4 g/dL      Total Bilirubin 0 23 mg/dL      eGFR 137 ml/min/1 73sq m     Narrative:      [place] guidelines for Chronic Kidney Disease (CKD):    •  Stage 1 with normal or high GFR (GFR > 90 mL/min/1 73 square meters)  •  Stage 2 Mild CKD (GFR = 60-89 mL/min/1 73 square meters)  •  Stage 3A Moderate CKD (GFR = 45-59 mL/min/1 73 square meters)  •  Stage 3B Moderate CKD (GFR = 30-44 mL/min/1 73 square meters)  •  Stage 4 Severe CKD (GFR = 15-29 mL/min/1 73 square meters)  •  Stage 5 End Stage CKD (GFR <15 mL/min/1 73 square meters)  Note: GFR calculation is accurate only with a steady state creatinine    Lipase [[id number]]  (Normal) Collected: 12/06/22 1711    Lab Status: Final result Specimen: Blood from Arm, Left Updated: 12/06/22 1734     Lipase 29 u/L     UA w Reflex to Microscopic w Reflex to Culture [[id number]]  (Abnormal) Collected: 12/06/22 1723    Lab Status: Final result Specimen: Urine, Clean Catch Updated: 12/06/22 1729     Color, UA Yellow     Clarity, UA Clear     Specific Gravity, UA >=1 030     pH, UA 6 0     Leukocytes, UA Negative     Nitrite, UA Negative     Protein, UA Negative mg/dl      Glucose, UA Negative mg/dl      Ketones, UA Negative mg/dl      Urobilinogen, UA 0 2 E U /dl      Bilirubin, UA Negative     Occult Blood, UA 3+    POCT pregnancy, urine [[id number]]  (Normal) Resulted: 12/06/22 1723    Lab Status: Final result Updated: 12/06/22 1723     EXT Preg Test, Ur Negative     Control Valid    CBC and differential [[id number]] Collected: 12/06/22 1711    Lab Status: Final result Specimen: Blood from Arm, Left Updated: 12/06/22 1719     WBC 6 89 Thousand/uL      RBC 4 48 Million/uL      Hemoglobin 13 1 g/dL      Hematocrit 38 9 %      MCV 87 fL      MCH 29 2 pg      MCHC 33 7 g/dL      RDW 12 7 %      MPV 10 1 fL      Platelets 596 Thousands/uL      nRBC 0 /100 WBCs      Neutrophils Relative 49 %      Immat GRANS % 0 %      Lymphocytes Relative 41 %      Monocytes Relative 8 %      Eosinophils Relative 2 %      Basophils Relative 0 %      Neutrophils Absolute 3 43 Thousands/µL      Immature Grans Absolute 0 01 Thousand/uL Lymphocytes Absolute 2 81 Thousands/µL      Monocytes Absolute 0 52 Thousand/µL      Eosinophils Absolute 0 11 Thousand/µL      Basophils Absolute 0 01 Thousands/µL                  CT abdomen pelvis without contrast   Final Result by Jessie Mercer MD (12/06 1749)      No acute inflammatory changes in the abdomen or pelvis; specifically, the appendix is noninflamed in this patient with right lower quadrant pain  Workstation performed: [id number]                    Procedures  Procedures         ED Course  ED Course as of 12/10/22 1540   Tue Dec 06, 2022   1748 WBC: 6 89                                             MDM  Number of Diagnoses or Management Options  Abdominal pain  Diagnosis management comments: DDx: appendicitis, ovarian cyst  Patient has no acute findings on CT scan  Pt is otherwise comfortable and pain started over 24 hours ago  Pt does not require pain medication in ED  Patient has no leukocytosis, no KEIRA, no anemia, no electrolyte abnormalities, no UTI  Pregnancy is negative  Pt aware to follow up with pcp, and aware of strict return precautions  Pt asking for food at discharge  Amount and/or Complexity of Data Reviewed  Clinical lab tests: ordered and reviewed  Tests in the radiology section of CPT®: ordered and reviewed    Risk of Complications, Morbidity, and/or Mortality  General comments: Pt arrived for RLQ  No acute findings on work up  Aware to follow up with PCP  Reviewed reasons to return to ed  Patient verbalized understanding of diagnosis and agreement with discharge plan of care as well as understanding of reasons to return to ed       Patient Progress  Patient progress: stable      Disposition  Final diagnoses:   Abdominal pain     Time reflects when diagnosis was documented in both MDM as applicable and the Disposition within this note     Time User Action Codes Description Comment    12/6/2022  5:59 PM Subha Jaquez Add [R10 9] Abdominal pain ED Disposition     ED Disposition   Discharge    Condition   Stable    Date/Time   Tue Dec 6, 2022  5:59 PM    Comment   Sofia Evans discharge to home/self care  Follow-up Information     Follow up With Specialties Details Why Contact Info Additional Esteban Ontiveros MD Family Medicine Schedule an appointment as soon as possible for a visit in 2 days For further evaluation of symptoms [address]  [address]  [phone]       [facility] Emergency Department Emergency Medicine Go to  If symptoms worsen [address]  [place] [phone]  [facility] Emergency Department, [address], [place], [address] [facility]          Discharge Medication List as of 12/6/2022  6:00 PM      CONTINUE these medications which have NOT CHANGED    Details   albuterol (ProAir HFA) 90 mcg/act inhaler Inhale 2 puffs every 4 (four) hours as needed for wheezing, Starting Fri 11/18/2022, Normal      FLUoxetine (PROzac) 20 mg capsule Take 1 capsule (20 mg total) by mouth daily Do not start before November 19, 2022 , Starting Sat 11/19/2022, Until Mon 12/19/2022, Normal      hydrOXYzine HCL (ATARAX) 25 mg tablet Take 1 tablet (25 mg total) by mouth every 6 (six) hours as needed for anxiety, Starting Fri 11/18/2022, Normal      melatonin 3 mg Take 1 tablet (3 mg total) by mouth daily at bedtime, Starting Fri 11/18/2022, Until Sun 12/18/2022, Normal      OLANZapine (ZyPREXA) 5 mg tablet Take 1 tablet (5 mg total) by mouth daily at bedtime, Starting Fri 11/18/2022, Until Sun 12/18/2022, Normal      prazosin (MINIPRESS) 1 mg capsule Take 1 capsule (1 mg total) by mouth daily at bedtime, Starting Fri 11/18/2022, Until Sun 12/18/2022, Normal             No discharge procedures on file      PDMP Review     None          ED Provider  Electronically Signed by           ITA Tirado  12/10/22 1662

## 2022-12-07 ENCOUNTER — APPOINTMENT (EMERGENCY)
Dept: RADIOLOGY | Facility: HOSPITAL | Age: 22
End: 2022-12-07

## 2022-12-07 ENCOUNTER — HOSPITAL ENCOUNTER (EMERGENCY)
Facility: HOSPITAL | Age: 22
Discharge: HOME | End: 2022-12-07
Attending: EMERGENCY MEDICINE

## 2022-12-07 VITALS
TEMPERATURE: 98.1 F | DIASTOLIC BLOOD PRESSURE: 69 MMHG | HEART RATE: 90 BPM | SYSTOLIC BLOOD PRESSURE: 132 MMHG | OXYGEN SATURATION: 97 % | RESPIRATION RATE: 18 BRPM

## 2022-12-07 DIAGNOSIS — J45.901 ASTHMA EXACERBATION: Primary | ICD-10-CM

## 2022-12-07 LAB
FLUAV RNA RESP QL NAA+PROBE: NEGATIVE
FLUBV RNA RESP QL NAA+PROBE: NEGATIVE
RSV RNA RESP QL NAA+PROBE: NEGATIVE
SARS-COV-2 RNA RESP QL NAA+PROBE: NEGATIVE

## 2022-12-07 RX ORDER — PREDNISONE 20 MG/1
20 TABLET ORAL DAILY
Qty: 5 TABLET | Refills: 0 | Status: SHIPPED | OUTPATIENT
Start: 2022-12-07 | End: 2022-12-12

## 2022-12-07 RX ORDER — IPRATROPIUM BROMIDE AND ALBUTEROL SULFATE .5; 3 MG/3ML; MG/3ML
1 SOLUTION RESPIRATORY (INHALATION) ONCE
Status: COMPLETED | OUTPATIENT
Start: 2022-12-07 | End: 2022-12-07

## 2022-12-07 NOTE — ED PROVIDER NOTES
History  Chief Complaint   Patient presents with   • Asthma     Pt presents via EMS from home c/o asthma attack  Pt states she tried her MDIs w/o relief  EMS administered Duoneb which pt responded to      68-year-old female with history of asthma presents to the emergency department after an asthma attack this afternoon  Patient felt short of breath and wheezy  She gave herself her albuterol inhaler and called 911  Patient was given DuoNeb in route  She states that her wheezing and shortness of breath have resolved since then  Denies cough, congestion, sore throat, chest pain, shortness of breath, headache, dizziness, abdominal pain, nausea, vomiting, dysuria, hematuria, fevers, chills, constipation, diarrhea  History provided by:  Patient   used: No        Prior to Admission Medications   Prescriptions Last Dose Informant Patient Reported? Taking? FLUoxetine (PROzac) 20 mg capsule   No No   Sig: Take 1 capsule (20 mg total) by mouth daily Do not start before November 19, 2022  OLANZapine (ZyPREXA) 5 mg tablet   No No   Sig: Take 1 tablet (5 mg total) by mouth daily at bedtime   albuterol (ProAir HFA) 90 mcg/act inhaler   No No   Sig: Inhale 2 puffs every 4 (four) hours as needed for wheezing   hydrOXYzine HCL (ATARAX) 25 mg tablet   No No   Sig: Take 1 tablet (25 mg total) by mouth every 6 (six) hours as needed for anxiety   melatonin 3 mg   No No   Sig: Take 1 tablet (3 mg total) by mouth daily at bedtime   prazosin (MINIPRESS) 1 mg capsule   No No   Sig: Take 1 capsule (1 mg total) by mouth daily at bedtime      Facility-Administered Medications: None       Past Medical History:   Diagnosis Date   • Anxiety    • Asthma    • Bipolar disorder ([facility] [address] )    • Depression    • PTSD (post-traumatic stress disorder)        History reviewed  No pertinent surgical history      Family History   Problem Relation Age of Onset   • ADD / ADHD Mother    • Anxiety disorder Mother    • Depression Mother    • Psychiatric Illness Mother    • Suicide Attempts Mother    • Hypertension Mother    • Bipolar disorder Father    • No Known Problems Half-Sister    • Seizures Half-Sister      I have reviewed and agree with the history as documented  E-Cigarette/Vaping   • E-Cigarette Use Former User    • Comments pt reports quitting  -11/12/22      E-Cigarette/Vaping Substances   • Nicotine No    • THC No    • CBD No    • Flavoring No    • Other No    • Unknown No      Social History     Tobacco Use   • Smoking status: Former   • Smokeless tobacco: Never   Vaping Use   • Vaping Use: Former   Substance Use Topics   • Alcohol use: Not Currently     Comment: denies -11/12/22   • Drug use: Not Currently     Types: Marijuana     Comment: quit 1 mos ago  - 11/12/22       Review of Systems   Constitutional: Negative for chills and fever  HENT: Negative for ear pain and sore throat  Eyes: Negative for pain and visual disturbance  Respiratory: Positive for shortness of breath and wheezing  Negative for cough  Cardiovascular: Negative for chest pain and palpitations  Gastrointestinal: Negative for abdominal pain and vomiting  Genitourinary: Negative for dysuria and hematuria  Musculoskeletal: Negative for arthralgias and back pain  Skin: Negative for color change and rash  Neurological: Negative for seizures and syncope  All other systems reviewed and are negative  Physical Exam  Physical Exam  Vitals and nursing note reviewed  Constitutional:       General: She is not in acute distress  Appearance: Normal appearance  She is well-developed and normal weight  She is not ill-appearing  HENT:      Head: Normocephalic and atraumatic  Right Ear: Tympanic membrane and external ear normal       Left Ear: Tympanic membrane and external ear normal       Nose: Nose normal       Mouth/Throat:      Mouth: Mucous membranes are moist       Pharynx: Oropharynx is clear   No oropharyngeal exudate or posterior oropharyngeal erythema  Eyes:      General: No scleral icterus  Right eye: No discharge  Left eye: No discharge  Conjunctiva/sclera: Conjunctivae normal       Pupils: Pupils are equal, round, and reactive to light  Cardiovascular:      Rate and Rhythm: Normal rate and regular rhythm  Pulses: Normal pulses  Heart sounds: Normal heart sounds  No murmur heard  Pulmonary:      Effort: Pulmonary effort is normal  No respiratory distress  Breath sounds: Normal breath sounds  No wheezing or rales  Chest:      Chest wall: No tenderness  Abdominal:      General: Abdomen is flat  Bowel sounds are normal       Palpations: Abdomen is soft  Tenderness: There is no abdominal tenderness  There is no right CVA tenderness or left CVA tenderness  Musculoskeletal:         General: No signs of injury  Normal range of motion  Cervical back: Normal range of motion and neck supple  No tenderness  Right lower leg: No edema  Left lower leg: No edema  Skin:     General: Skin is warm and dry  Capillary Refill: Capillary refill takes less than 2 seconds  Findings: No rash  Neurological:      General: No focal deficit present  Mental Status: She is alert and oriented to person, place, and time  Mental status is at baseline  Motor: No weakness  Gait: Gait normal    Psychiatric:         Mood and Affect: Mood normal          Behavior: Behavior normal          Thought Content:  Thought content normal          Vital Signs  ED Triage Vitals [12/07/22 1824]   Temperature Pulse Respirations Blood Pressure SpO2   98 1 °F (36 7 °C) (!) 107 18 132/69 99 %      Temp Source Heart Rate Source Patient Position - Orthostatic VS BP Location FiO2 (%)   Tympanic Monitor Sitting Left arm --      Pain Score       --           Vitals:    12/07/22 1824 12/07/22 1950   BP: 132/69    Pulse: (!) 107 90   Patient Position - Orthostatic VS: Sitting          Visual Acuity      ED Medications  Medications   ipratropium-albuterol (FOR EMS ONLY) (DUO-NEB) 0 5-2 5 mg/3 mL inhalation solution 3 mL (0 mL Does not apply Given to EMS 12/7/22 1830)       Diagnostic Studies  Results Reviewed     Procedure Component Value Units Date/Time    FLU/RSV/COVID - if FLU/RSV clinically relevant [[id number]]  (Normal) Collected: 12/07/22 1958    Lab Status: Final result Specimen: Nares from Nose Updated: 12/07/22 2039     SARS-CoV-2 Negative     INFLUENZA A PCR Negative     INFLUENZA B PCR Negative     RSV PCR Negative    Narrative:      FOR PEDIATRIC PATIENTS - copy/paste COVID Guidelines URL to browser: [email or web address]  [employer]    SARS-CoV-2 assay is a Nucleic Acid Amplification assay intended for the  qualitative detection of nucleic acid from SARS-CoV-2 in nasopharyngeal  swabs  Results are for the presumptive identification of SARS-CoV-2 RNA  Positive results are indicative of infection with SARS-CoV-2, the virus  causing COVID-19, but do not rule out bacterial infection or co-infection  with other viruses  Laboratories within the [place] and its  territories are required to report all positive results to the appropriate  public health authorities  Negative results do not preclude SARS-CoV-2  infection and should not be used as the sole basis for treatment or other  patient management decisions  Negative results must be combined with  clinical observations, patient history, and epidemiological information  This test has not been FDA cleared or approved  This test has been authorized by FDA under an Emergency Use Authorization  (EUA)  This test is only authorized for the duration of time the  declaration that circumstances exist justifying the authorization of the  emergency use of an in vitro diagnostic tests for detection of SARS-CoV-2  virus and/or diagnosis of COVID-19 infection under section 564(b)(1) of  the Act, 21 U  [place] C  360bbb-3(b)(1), unless the authorization is terminated  or revoked sooner  The test has been validated but independent review by FDA  and CLIA is pending  Test performed using [employer] GeneXpert: This RT-PCR assay targets N2,  a region unique to SARS-CoV-2  A conserved region in the E-gene was chosen  for pan-Sarbecovirus detection which includes SARS-CoV-2  According to CMS-2020-01-R, this platform meets the definition of high-throughput technology  XR chest 1 view portable   ED Interpretation by Georgette Lazar PA-C (12/07 1946)   No acute cardiopulmonary process      Final Result by Calvin Rhodes MD (12/08 7182)      No acute cardiopulmonary disease  Workstation performed: [id number]                    Procedures  Procedures         ED Course                                             MDM  Number of Diagnoses or Management Options  Asthma exacerbation: new and requires workup  Diagnosis management comments: 47-year-old female with history of asthma presents to the emergency department after an asthma exacerbation that she had prior to arrival   On my exam, patient is overall nontoxic-appearing, in no acute distress  No wheezing auscultated on lung exam   Patient appears comfortable resting in her bed  Exam relatively benign  Will obtain chest x-ray and COVID/flu/RSV testing  No further work-up necessary  Chest x-ray shows no acute cardiopulmonary process  Negative viral studies  Discussed the results with the patient who verbalizes understanding  Recommended that she follow-up with her family doctor for further monitoring and evaluation and management of her asthma  Patient amenable to discharge at this time  Very strict return precautions discussed  Patient stable time of discharge         Amount and/or Complexity of Data Reviewed  Clinical lab tests: ordered and reviewed  Tests in the radiology section of CPT®: ordered and reviewed  Review and summarize past medical records: yes  Independent visualization of images, tracings, or specimens: yes    Patient Progress  Patient progress: stable      Disposition  Final diagnoses:   Asthma exacerbation     Time reflects when diagnosis was documented in both MDM as applicable and the Disposition within this note     Time User Action Codes Description Comment    12/7/2022  7:53 PM Anneliesegénesis Aguilar Add [C94 [id number]] Asthma exacerbation       ED Disposition     ED Disposition   Discharge    Condition   Stable    Date/Time   Wed Dec 7, 2022  7:52 PM    Comment   Teofilo Beavers discharge to home/self care  Follow-up Information     Follow up With Specialties Details Why Contact Info Additional Harry Contreras MD Family Medicine Call in 3 days follow up for further evaluation of symptoms [address] [facility]    [address] [address]  [phone]       [address] [place] [address] Emergency Department Emergency Medicine Go to  If symptoms worsen [address] [id number]  [facility] Emergency Department, [address] [place], [place], [address] [facility]          Discharge Medication List as of 12/7/2022  7:55 PM      START taking these medications    Details   predniSONE 20 mg tablet Take 1 tablet (20 mg total) by mouth daily for 5 days, Starting Wed 12/7/2022, Until Mon 12/12/2022, Normal         CONTINUE these medications which have NOT CHANGED    Details   albuterol (ProAir HFA) 90 mcg/act inhaler Inhale 2 puffs every 4 (four) hours as needed for wheezing, Starting Fri 11/18/2022, Normal      FLUoxetine (PROzac) 20 mg capsule Take 1 capsule (20 mg total) by mouth daily Do not start before November 19, 2022 , Starting Sat 11/19/2022, Until Mon 12/19/2022, Normal      hydrOXYzine HCL (ATARAX) 25 mg tablet Take 1 tablet (25 mg total) by mouth every 6 (six) hours as needed for anxiety, Starting Fri 11/18/2022, Normal      melatonin 3 mg Take 1 tablet (3 mg total) by mouth daily at bedtime, Starting Fri 11/18/2022, Until Sun 12/18/2022, Normal      OLANZapine (ZyPREXA) 5 mg tablet Take 1 tablet (5 mg total) by mouth daily at bedtime, Starting Fri 11/18/2022, Until Sun 12/18/2022, Normal      prazosin (MINIPRESS) 1 mg capsule Take 1 capsule (1 mg total) by mouth daily at bedtime, Starting Fri 11/18/2022, Until Sun 12/18/2022, Normal             No discharge procedures on file      PDMP Review     None          ED Provider  Electronically Signed by           Rodger Voss PA-C  12/09/22 1954

## 2022-12-08 NOTE — DISCHARGE INSTRUCTIONS
Please follow up with your family doctor for further evaluation  If you develop any significant changes or worsening condition, please return to the emergency department for re-evaluation

## 2023-02-12 NOTE — ED NOTES
Attempted to call report x1  Was told it was change of shift and they would call back       Vishal Mendenhall  11/12/22 3849 Yes